# Patient Record
Sex: FEMALE | Race: WHITE | Employment: FULL TIME | URBAN - METROPOLITAN AREA
[De-identification: names, ages, dates, MRNs, and addresses within clinical notes are randomized per-mention and may not be internally consistent; named-entity substitution may affect disease eponyms.]

---

## 2018-07-27 ENCOUNTER — ROUTINE PRENATAL (OUTPATIENT)
Dept: OBGYN CLINIC | Age: 33
End: 2018-07-27

## 2018-07-27 ENCOUNTER — HOSPITAL ENCOUNTER (OUTPATIENT)
Dept: LAB | Age: 33
Discharge: HOME | End: 2018-07-27
Payer: COMMERCIAL

## 2018-07-27 ENCOUNTER — OFFICE VISIT (OUTPATIENT)
Dept: OBGYN CLINIC | Age: 33
End: 2018-07-27

## 2018-07-27 VITALS
TEMPERATURE: 98 F | SYSTOLIC BLOOD PRESSURE: 117 MMHG | HEIGHT: 63 IN | DIASTOLIC BLOOD PRESSURE: 87 MMHG | HEART RATE: 78 BPM | OXYGEN SATURATION: 100 %

## 2018-07-27 VITALS
SYSTOLIC BLOOD PRESSURE: 117 MMHG | HEIGHT: 63 IN | TEMPERATURE: 98 F | OXYGEN SATURATION: 98 % | DIASTOLIC BLOOD PRESSURE: 81 MMHG | HEART RATE: 78 BPM

## 2018-07-27 DIAGNOSIS — N89.8 VAGINAL DISCHARGE: ICD-10-CM

## 2018-07-27 DIAGNOSIS — N92.6 MISSED MENSES: Primary | ICD-10-CM

## 2018-07-27 DIAGNOSIS — O20.0 THREATENED ABORTION: ICD-10-CM

## 2018-07-27 DIAGNOSIS — O09.291 HISTORY OF MISCARRIAGE, CURRENTLY PREGNANT, FIRST TRIMESTER: ICD-10-CM

## 2018-07-27 LAB
HCG URINE, QL. (POC): POSITIVE
VALID INTERNAL CONTROL?: YES

## 2018-07-27 PROCEDURE — 87491 CHLMYD TRACH DNA AMP PROBE: CPT | Performed by: OBSTETRICS & GYNECOLOGY

## 2018-07-27 NOTE — PROGRESS NOTES
Name: Iwona Gutierrez MRN: [id number] G2    YOB: 1985  Age: 27 y.o. Sex: female        Chief Complaint   Patient presents with    Missed Menses       HPI     1. Missed menses  Pt noted that her LMP was unsure, is 8 weeks she thinks, she had a miscarriage May 22, confirmed at urgent care with blood test, thought she was 6-7 weeks, had vaginal bleeding May 22 and assumed she was having a miscarriage, saw [facility] who did a pregnancy test which was neg, never had an US, took a pregnancy test at the end of  which was +, is having nausea, is not having vomiting, is not having VB currently but is having brown D/C that seems more yellow, is having cramping off/on, is taking a prenatal vitamin, is having breast tenderness, was using nothing for birth control     2. Vaginal bleeding  Notes that she has had spotting and brown D/C that has recently turned yellow    3. Miscarriage  LMP , + pregnancy test, had bleeding , went to [facility], had neg UPT, end of  had + UPT again    OB History      Para Term  AB Living    2 1  1 1 1    SAB TAB Ectopic Molar Multiple Live Births    1    0 1        Obstetric Comments    PROM at 35 weeks    LMP no period since miscarriage in May  Periods irregular, last 6 days, flow medium, minimal dysmenorrhea  History of sexually transmitted infections never  Last pap unsure             PGyn  History   Sexual Activity    Sexual activity: Yes    Partners: Male         Past Medical History:   Diagnosis Date    Abnormal Papanicolaou smear of cervix     conization of cervix for abn pap    Infertility, female        Past Surgical History:   Procedure Laterality Date    CKC, AKA COLD KNIFE CONE         No Known Allergies    Current Outpatient Prescriptions on File Prior to Visit   Medication Sig Dispense Refill    prenatal multivit-ca-min-fe-fa tab Take  by mouth. No current facility-administered medications on file prior to visit. Social History     Social History    Marital status:      Spouse name: N/A    Number of children: N/A    Years of education: N/A     Occupational History    Not on file. Social History Main Topics    Smoking status: Former Smoker    Smokeless tobacco: Never Used    Alcohol use Yes      Comment: wine monthly, not while pregnant    Drug use: No    Sexual activity: Yes     Partners: Male     Other Topics Concern    Not on file     Social History Narrative       Family History   Problem Relation Age of Onset    No Known Problems Mother     Heart Disease Father        Review of Systems   Constitutional: Negative. HENT: Negative. Respiratory: Negative. Cardiovascular: Negative. Gastrointestinal: Positive for abdominal pain and nausea. Negative for constipation, diarrhea and vomiting. Genitourinary: Negative. Musculoskeletal: Negative. Skin: Negative. Neurological: Negative. Psychiatric/Behavioral: Negative. Visit Vitals    /81    Pulse 78    Temp 98 °F (36.7 °C) (Oral)    Ht 5' 3\" (1.6 m)    SpO2 98%           GENERAL:  Well developed, well nourished, in no distress  NEURO/PSYCHE: Grossly intact, normal mood and affect  HEENT: Normal cephalic, atraumatic, good dentition, neck supple.  No thyromegaly  CV: regular rate and rhythm  LUNGS: clear to auscultation bilaterally, no wheezes, rhonchi or rales, good air entry with normal effort  ABDOMEN: + BS, soft without tenderness, no guarding, rebound or masses  EXTREMITIES: no edema or erythema noted  SKIN:  Warm, dry, no lesions  LYMPHATICS: No supraclavicular or inguinal nodes noted    PELVIC EXAM:  LABIA MAJORA: no masses, tenderness or lesions  LABIA MINORA: no masses, tenderness or lesions  CLITORIS: no masses, tenderness or lesions  URETHRA: normal appearing, no masses or tenderness  BLADDER: no fullness or tenderness  VAGINA: pink appearing vagina with small amount of blood-streaked mucus and physiologic appearing discharge, 2 mm cystic lesion removed from her cervix  PERINEUM: no masses, tenderness or lesions  CERVIX: No CMT or lesions  UTERUS: small, mobile, nontender  ADNEXA: nontender and no masses    Recent Results (from the past 24 hour(s))   AMB POC URINE PREGNANCY TEST, VISUAL COLOR COMPARISON    Collection Time: 18  3:30 PM   Result Value Ref Range    VALID INTERNAL CONTROL POC Yes     HCG urine, Ql. (POC) Positive Negative         ICD-10-CM ICD-9-CM   1. Missed menses N92.6 626.4   2. Threatened  O20.0 640.00   3. History of miscarriage, currently pregnant, first trimester O09.291 V23.2   4. Vaginal discharge N89.8 623.5       1. Missed menses  Never had a menses after miscarriage, reviewed pregnancy nutrition, staying on her prenatal vitamins. 2. Threatened   Reviewed that she had vaginal bleeding that has turned to brown discharge, ultrasound showed viable IUP. Reviewed that she will be AMA with this pregnancy and offered first trimester testing with [facility] which patient would like referral made to [facility]. - AMB POC US OB TRANSVAGINAL    3. History of miscarriage, currently pregnant, first trimester  No no menses since her miscarriage    4. Vaginal discharge  No itching or odor, will test for STIs  - CHLAMYDIA/NEISSERIA/TRICHOMONAS AMP;  Future    F/U 4 weeks for NOB

## 2018-07-27 NOTE — PATIENT INSTRUCTIONS
Screening Tests for Birth Defects: Care Instructions  Your Care Instructions    Screening tests for birth defects are done during pregnancy to look for possible problems with the baby (fetus). They show the chance that a baby has a certain birth defect. Down syndrome, spina bifida, and trisomy 25 are examples. There are many types of screening tests you may have during your pregnancy. During your first trimester you may have:  · Blood tests at 10 to 13 weeks. · Nuchal translucency test at 11 to 14 weeks. · Cell free fetal DNA test at 10 weeks or later. During your second trimester, you may have:  · Triple or quad screening at 15 to 20 weeks. · Ultrasound at 18 to 20 weeks. Follow-up care is a key part of your treatment and safety. Be sure to make and go to all appointments, and call your doctor if you are having problems. It's also a good idea to know your test results and keep a list of the medicines you take. Why are these tests done? Blood tests measure the amounts of certain substances in your blood. For these tests, a health professional takes a sample of your blood. Cell free fetal DNA test is used to help find genetic problems. Triple or quad screening are blood tests that can be used to find out if there is a risk of certain health problems. If any of these tests point to a problem, your doctor will suggest other tests to find out for sure if there is a problem. Nuchal translucency test uses ultrasound to measure the thickness of the area at the back of the baby's neck. An increase in thickness can be an early sign of certain birth defects. Ultrasound is a tool that uses sound waves to make pictures of your baby and placenta inside the uterus. Ultrasound lets your doctor see an image of your baby. It can help your doctor look for problems of the heart, spine, belly, or other areas. Many pregnant women choose to have these tests done as a routine part of their care.  Some choose to have tests if they are at higher risk for having a baby with a birth defect. How can you care for yourself at home? · You can return right away to your usual activities for this stage of pregnancy, unless your doctor gives you different instructions. · If you are concerned or worried about the results of your tests, talk with loved ones or friends. You can also talk to a genetic counselor or your doctor. When should you call for help? Watch closely for changes in your health, and be sure to contact your doctor if you have any problems. Where can you learn more? Go to http://ayakaiSIGHT Partnersmarie.info/. Enter 830 0115 in the search box to learn more about \"Screening Tests for Birth Defects: Care Instructions. \"  Current as of: November 21, 2017  Content Version: 11.7  © 7610-3750 [employer]. Care instructions adapted under license by [employer] (which disclaims liability or warranty for this information). If you have questions about a medical condition or this instruction, always ask your healthcare professional. [place] [address] any warranty or liability for your use of this information. Weeks 6 to 10 of Your Pregnancy: Care Instructions  Your Care Instructions    Congratulations on your pregnancy. This is an exciting and important time for you. During the first 6 to 10 weeks of your pregnancy, your body goes through many changes. Your baby grows very fast, even though you cannot feel it yet. You may start to notice that you feel different, both in your body and your emotions. Because each woman's pregnancy is unique, there is no right way to feel. You may feel the healthiest you have ever been, or you may feel tired or sick to your stomach (\"morning sickness\"). These early weeks are a time to make healthy choices and to eat the best foods for you and your baby. This care sheet will give you some ideas. This is also a good time to think about birth defects testing. These are tests done during pregnancy to look for possible problems with the baby. First trimester tests for birth defects can be done between 8 and 17 weeks of pregnancy, depending on the test. Talk with your doctor about what kinds of tests are available. Follow-up care is a key part of your treatment and safety. Be sure to make and go to all appointments, and call your doctor if you are having problems. It's also a good idea to know your test results and keep a list of the medicines you take. How can you care for yourself at home? Eat well  · Eat at least 3 meals and 2 healthy snacks every day. Eat fresh, whole foods, including:  ¨ 7 or more servings of bread, tortillas, cereal, rice, pasta, or oatmeal.  ¨ 3 or more servings of vegetables, especially leafy green vegetables. ¨ 2 or more servings of fruits. ¨ 3 or more servings of milk, yogurt, or cheese. ¨ 2 or more servings of meat, turkey, chicken, fish, eggs, or dried beans. · Drink plenty of fluids, especially water. Avoid sodas and other sweetened drinks. · Choose foods that have important vitamins for your baby, such as calcium, iron, and folate. ¨ Dairy products, tofu, canned fish with bones, almonds, broccoli, dark leafy greens, corn tortillas, and fortified orange juice are good sources of calcium. ¨ Beef, poultry, liver, spinach, lentils, dried beans, fortified cereals, and dried fruits are rich in iron. ¨ Dark leafy greens, broccoli, asparagus, liver, fortified cereals, orange juice, peanuts, and almonds are good sources of folate. · Avoid foods that could harm your baby. ¨ Do not eat raw or undercooked meat, chicken, or fish (such as sushi or raw oysters). ¨ Do not eat raw eggs or foods that contain raw eggs, such as Caesar dressing. ¨ Do not eat soft cheeses and unpasteurized dairy foods, such as Brie, feta, or blue cheese. ¨ Do not eat fish that contains a lot of mercury, such as shark, swordfish, tilefish, or trent mackerel.  Do not eat more than 6 ounces of tuna each week. ¨ Do not eat raw sprouts, especially alfalfa sprouts. ¨ Cut down on caffeine, such as coffee, tea, and cola. Protect yourself and your baby  · Do not touch leatha litter or cat feces. They can cause an infection that could harm your baby. · High body temperature can be harmful to your baby. So if you want to use a sauna or hot tub, be sure to talk to your doctor about how to use it safely. [place] with morning sickness  · Sip small amounts of water, juices, or shakes. Try drinking between meals, not with meals. · Eat 5 or 6 small meals a day. Try dry toast or crackers when you first get up, and eat breakfast a little later. · Avoid spicy, greasy, and fatty foods. · When you feel sick, open your windows or go for a short walk to get fresh air. · Try nausea wristbands. These help some women. · Tell your doctor if you think your prenatal vitamins make you sick. Where can you learn more? Go to http://ayaka-marie.info/. Enter G112 in the search box to learn more about \"Weeks 6 to 10 of Your Pregnancy: Care Instructions. \"  Current as of: November 21, 2017  Content Version: 11.7  © 5024-1271 [employer]. Care instructions adapted under license by [employer] (which disclaims liability or warranty for this information). If you have questions about a medical condition or this instruction, always ask your healthcare professional. [place] [address] any warranty or liability for your use of this information.

## 2018-07-27 NOTE — LETTER
7/27/2018 4:33 PM 
 
Ms. Cali [address] [address] 
 
 
 is an obstetric patient in my practice. She is cleared to have dental work with a double shield x-ray, lidocaine, and amoxicillin for treatment. Should you require additional information please contact our office at [phone]. Sincerely, Aunpama Moreno MD

## 2018-07-30 ENCOUNTER — DOCUMENTATION ONLY (OUTPATIENT)
Dept: OBGYN CLINIC | Age: 33
End: 2018-07-30

## 2018-07-30 LAB
C TRACH RRNA SPEC QL NAA+PROBE: NEGATIVE
N GONORRHOEA RRNA SPEC QL NAA+PROBE: NEGATIVE
SPECIMEN SOURCE: NORMAL
T VAGINALIS RRNA SPEC QL NAA+PROBE: NEGATIVE

## 2018-08-22 ENCOUNTER — HOSPITAL ENCOUNTER (OUTPATIENT)
Dept: LAB | Age: 33
Discharge: HOME | End: 2018-08-22
Payer: COMMERCIAL

## 2018-08-22 ENCOUNTER — ROUTINE PRENATAL (OUTPATIENT)
Dept: OBGYN CLINIC | Age: 33
End: 2018-08-22

## 2018-08-22 VITALS
OXYGEN SATURATION: 100 % | DIASTOLIC BLOOD PRESSURE: 77 MMHG | HEART RATE: 82 BPM | WEIGHT: 139.4 LBS | SYSTOLIC BLOOD PRESSURE: 123 MMHG | BODY MASS INDEX: 24.7 KG/M2 | HEIGHT: 63 IN | TEMPERATURE: 99 F

## 2018-08-22 DIAGNOSIS — Z98.890 HISTORY OF LEEP (LOOP ELECTROSURGICAL EXCISION PROCEDURE) OF CERVIX COMPLICATING PREGNANCY, FIRST TRIMESTER: ICD-10-CM

## 2018-08-22 DIAGNOSIS — O34.41 HISTORY OF LEEP (LOOP ELECTROSURGICAL EXCISION PROCEDURE) OF CERVIX COMPLICATING PREGNANCY, FIRST TRIMESTER: ICD-10-CM

## 2018-08-22 DIAGNOSIS — O09.521 ENCOUNTER FOR SUPERVISION OF MULTIGRAVIDA OF ADVANCED MATERNAL AGE IN FIRST TRIMESTER: ICD-10-CM

## 2018-08-22 DIAGNOSIS — Z3A.11 11 WEEKS GESTATION OF PREGNANCY: ICD-10-CM

## 2018-08-22 DIAGNOSIS — O09.521 ENCOUNTER FOR SUPERVISION OF MULTIGRAVIDA OF ADVANCED MATERNAL AGE IN FIRST TRIMESTER: Primary | ICD-10-CM

## 2018-08-22 DIAGNOSIS — O09.291 HISTORY OF PROM (PREMATURE RUPTURE OF MEMBRANES), CURRENTLY PREGNANT, FIRST TRIMESTER: ICD-10-CM

## 2018-08-22 LAB
ABO + RH BLD: NORMAL
BLOOD GROUP ANTIBODIES SERPL: NORMAL
ERYTHROCYTE [DISTWIDTH] IN BLOOD BY AUTOMATED COUNT: 12.9 % (ref 11.6–14.5)
HCT VFR BLD AUTO: 41.2 % (ref 35–45)
HGB BLD-MCNC: 14.5 G/DL (ref 12–16)
MCH RBC QN AUTO: 31.2 PG (ref 24–34)
MCHC RBC AUTO-ENTMCNC: 35.2 G/DL (ref 31–37)
MCV RBC AUTO: 88.6 FL (ref 74–97)
PLATELET # BLD AUTO: 241 K/UL (ref 135–420)
PMV BLD AUTO: 10.5 FL (ref 9.2–11.8)
RBC # BLD AUTO: 4.65 M/UL (ref 4.2–5.3)
SPECIMEN EXP DATE BLD: NORMAL
WBC # BLD AUTO: 9.5 K/UL (ref 4.6–13.2)

## 2018-08-22 PROCEDURE — 87340 HEPATITIS B SURFACE AG IA: CPT | Performed by: OBSTETRICS & GYNECOLOGY

## 2018-08-22 PROCEDURE — 86762 RUBELLA ANTIBODY: CPT | Performed by: OBSTETRICS & GYNECOLOGY

## 2018-08-22 PROCEDURE — 87389 HIV-1 AG W/HIV-1&-2 AB AG IA: CPT | Performed by: OBSTETRICS & GYNECOLOGY

## 2018-08-22 PROCEDURE — 81220 CFTR GENE COM VARIANTS: CPT | Performed by: OBSTETRICS & GYNECOLOGY

## 2018-08-22 PROCEDURE — 88175 CYTOPATH C/V AUTO FLUID REDO: CPT | Performed by: OBSTETRICS & GYNECOLOGY

## 2018-08-22 PROCEDURE — 87624 HPV HI-RISK TYP POOLED RSLT: CPT | Performed by: OBSTETRICS & GYNECOLOGY

## 2018-08-22 PROCEDURE — 83021 HEMOGLOBIN CHROMOTOGRAPHY: CPT | Performed by: OBSTETRICS & GYNECOLOGY

## 2018-08-22 PROCEDURE — 86900 BLOOD TYPING SEROLOGIC ABO: CPT | Performed by: OBSTETRICS & GYNECOLOGY

## 2018-08-22 PROCEDURE — 85027 COMPLETE CBC AUTOMATED: CPT | Performed by: OBSTETRICS & GYNECOLOGY

## 2018-08-22 PROCEDURE — 86780 TREPONEMA PALLIDUM: CPT | Performed by: OBSTETRICS & GYNECOLOGY

## 2018-08-22 NOTE — MR AVS SNAPSHOT
[address] [place], [place] [address]Jareth Perez 030, [address] [place] [facility] [address] 
[phone] Patient: Olivia Sanchez MRN: [id number] :[id number] Visit Information Date & Time Provider Department Dept. Phone Encounter #  
 2018  4:00 PM Mohini Thapa MD [facility] [id number] Follow-up Instructions Return in about 4 weeks (around 2018) for return OB. Upcoming Health Maintenance Date Due  
 PAP AKA CERVICAL CYTOLOGY 2004 Influenza Age 5 to Adult 2018 Allergies as of 2018  Review Complete On: 2018 By: Mohini Thapa MD  
 No Known Allergies Current Immunizations  Never Reviewed No immunizations on file. Not reviewed this visit You Were Diagnosed With   
  
 Codes Comments Encounter for supervision of multigravida of advanced maternal age in first trimester    -  Primary ICD-10-CM: O09.521 ICD-9-CM: V23.82   
 11 weeks gestation of pregnancy     ICD-10-CM: Z3A.11 
ICD-9-CM: V22.2 History of PROM (premature rupture of membranes), currently pregnant, first trimester     ICD-10-CM: O09.291 ICD-9-CM: V23.49 History of LEEP (loop electrosurgical excision procedure) of cervix complicating pregnancy, first trimester     ICD-10-CM: O34.41, E35.958 ICD-9-CM: 654.63, V45.89 Vitals BP Pulse Temp Height(growth percentile) Weight(growth percentile) LMP  
 123/77 82 99 °F (37.2 °C) (Oral) 5' 3\" (1.6 m) 139 lb 6.4 oz (63.2 kg) 2018 SpO2 BMI OB Status Smoking Status 100% 24.69 kg/m2 Pregnant Former Smoker BMI and BSA Data Body Mass Index Body Surface Area  
 24.69 kg/m 2 1.68 m 2 Preferred Pharmacy Pharmacy Name Phone [facility], [address] [place] [address] [phone] Your Updated Medication List  
  
   
This list is accurate as of 18  5:12 PM.  Always use your most recent med list.  
  
  
  
 prenatal multivit-ca-min-fe-fa Tab Take  by mouth. Follow-up Instructions Return in about 4 weeks (around 9/19/2018) for return OB. To-Do List   
 08/22/2018 Lab:  CBC W/O DIFF   
  
 08/22/2018 Lab:  CYSTIC FIBROSIS MUTATIONS   
  
 08/22/2018 Lab:  HEMOGLOBIN FRACTIONATION   
  
 08/22/2018 Lab:  HEP B SURFACE AG   
  
 08/22/2018 Lab:  HIV 1/2 AG/AB, 4TH GENERATION,W RFLX CONFIRM   
  
 08/22/2018 Pathology:  PAP IG, APTIMA HPV AND RFX 16/18,45 ([id number])   
  
 08/22/2018 Lab:  RUBELLA AB, IGG   
  
 08/22/2018 Blood Bank:  TYPE & SCREEN   
  
 08/24/2018 Lab:  T PALLIDUM AB Patient Instructions Learning About Birth Defects Testing What is birth defects testing? Birth defects testing is done during pregnancy to look for possible problems with the baby (fetus). A birth defect may have only a minor impact on a child's life. Or it can have a major effect on quality or length of life. You and your doctor can choose from many tests. You may have no tests, one test, or many tests. Talking with a genetic counselor may help you make decisions about testing. The counselor is trained to help you understand these tests. He or she can also help you find resources for support. What are the types of tests? You may have a screening test or a diagnostic test. Or you may have both types of tests. Screening tests show the chance that a baby has a certain birth defect, such as Down syndrome, spina bifida, or trisomy 25. Diagnostic tests show if a baby has a certain birth defect. · Screening tests and when they are done: ¨ Blood tests at 10 to 13 weeks (first trimester) ¨ Cell free fetal DNA test at 10 weeks or later (first trimester) ¨ Nuchal translucency test at 11 to 14 weeks (first trimester) ¨ Triple or quad screening at 15 to 20 weeks (second trimester) ¨ Ultrasound at 18 to 20 weeks (second trimester) · Diagnostic tests and when they are done: ¨ Chorionic villus sampling (CVS) at 10 to 13 weeks (first trimester) ¨ Amniocentesis at 13 to 20 weeks (second trimester) In some cases, the doctors look at the combined screenings that you've had over a period of time. This is called an integrated screening. What are the screening tests? · Blood tests are done to look at different substances in your blood. These tests include cell free fetal DNA and triple or quadruple (quad) blood tests. Both of these tests can help find genetic problems. · Nuchal translucency test uses ultrasound to measure the thickness of the area at the back of the baby's neck. An increase in thickness can be an early sign of certain birth defects. Ultrasound is a tool that uses sound waves to make pictures of your baby and placenta inside the uterus. · Ultrasound lets your doctor see an image of your baby. It can help your doctor look for problems of the heart, spine, belly, or other areas. What are the diagnostic tests? Chorionic villus sampling (CVS) looks at cells from the placenta. To do the test, your doctor may put a thin tube through your vagina and cervix to take out a small piece of the placenta. Or the doctor may take out the piece through a needle in your belly. This test can diagnose many genetic diseases. But it can't find problems with the spinal cord. Amniocentesis looks at the amniotic fluid that surrounds your baby. Your doctor will put a needle through your belly into your uterus and take out a very small amount of fluid to test. 
What are the risks of these tests? There is a small risk of a miscarriage after a CVS or amniocentesis. Your doctor or genetic counselor can help you understand this risk. These tests are generally very safe. Where can you learn more? Go to http://ayaka-marie.info/. Enter G030 in the search box to learn more about \"Learning About Birth Defects Testing. \" 
 Current as of: November 21, 2017 Content Version: 11.7 © 7670-9984 [email or web address]. Care instructions adapted under license by [employer] (which disclaims liability or warranty for this information). If you have questions about a medical condition or this instruction, always ask your healthcare professional. [address] any warranty or liability for your use of this information. Weeks 10 to 14 of Your Pregnancy: Care Instructions Your Care Instructions By weeks 10 to 15 of your pregnancy, the placenta has formed inside your uterus. It is possible to hear your baby's heartbeat with a special ultrasound device. Your baby's eyes can and do move. The arms and legs can bend. This is a good time to think about testing for birth defects. There are two types of tests: screening and diagnostic. Screening tests show the chance that a baby has a certain birth defect. They can't tell you for sure that your baby has a problem. Diagnostic tests show if a baby has a certain birth defect. It's your choice whether to have these tests. You and your partner can talk to your doctor or midwife about birth defects tests. Follow-up care is a key part of your treatment and safety. Be sure to make and go to all appointments, and call your doctor if you are having problems. It's also a good idea to know your test results and keep a list of the medicines you take. How can you care for yourself at home? Decide about tests · You can have screening tests and diagnostic tests to check for birth defects. The decision to have a test for birth defects is personal. Think about your age, your chance of passing on a family disease, your need to know about any problems, and what you might do after you have the test results. ¨ Triple or quadruple (quad) blood tests. These screening tests can be done between 15 and 20 weeks of pregnancy.  They check the amounts of three or four substances in your blood. The doctor looks at these test results, along with your age and other factors, to find out the chance that your baby may have certain problems. ¨ Amniocentesis. This diagnostic test is used to look for chromosomal problems in the baby's cells. It can be done between 15 and 20 weeks of pregnancy, usually around week 16. 
¨ Nuchal translucency test. This test uses ultrasound to measure the thickness of the area at the back of the baby's neck. An increase in the thickness can be an early sign of Down syndrome. ¨ Chorionic villus sampling (CVS). This is a test that looks for certain genetic problems with your baby. The same genes that are in your baby are in the placenta. A small piece of the placenta is taken out and tested. This test is done when you are 10 to 13 weeks pregnant. Ease discomfort · Slow down and take naps when you feel tired. · If your emotions swing, talk to someone. Crying, anxiety, and concentration problems are common. · If your gums bleed, try a softer toothbrush. If your gums are puffy and bleed a lot, see your dentist. 
· If you feel dizzy: ¨ Get up slowly after sitting or lying down. ¨ Drink plenty of fluids. ¨ Eat small snacks to keep your blood sugar stable. ¨ Put your head between your legs as though you were tying your shoelaces. ¨ Lie down with your legs higher than your head. Use pillows to prop up your feet. · If you have a headache: 
¨ Lie down. ¨ Ask your partner or a good friend for a neck massage. ¨ Try cool cloths over your forehead or across the back of your neck. ¨ Use acetaminophen (Tylenol) for pain relief. Do not use nonsteroidal anti-inflammatory drugs (NSAIDs), such as ibuprofen (Advil, Motrin) or naproxen (Aleve), unless your doctor says it is okay. · If you have a nosebleed, pinch your nose gently, and hold it for a short while.  To prevent nosebleeds, try massaging a small dab of petroleum jelly, such as Vaseline, in your nostrils. · If your nose is stuffed up, try saline (saltwater) nose sprays. Do not use decongestant sprays. Care for your breasts · Wear a bra that gives you good support. · Know that changes in your breasts are normal. 
¨ Your breasts may get larger and more tender. Tenderness usually gets better by 12 weeks. ¨ Your nipples may get darker and larger, and small bumps around your nipples may show more. ¨ The veins in your chest and breasts may show more. · Don't worry about \"toughening'\" your nipples. Breastfeeding will naturally do this. Where can you learn more? Go to http://ayaka-marie.info/. Enter K902 in the search box to learn more about \"Weeks 10 to 14 of Your Pregnancy: Care Instructions. \" Current as of: November 21, 2017 Content Version: 11.7 © 1926-8221 Signpost. Care instructions adapted under license by [employer] (which disclaims liability or warranty for this information). If you have questions about a medical condition or this instruction, always ask your healthcare professional. [place] [address] any warranty or liability for your use of this information. Introducing [facility]! [employer] introduces Vector[employer] patient portal. Now you can access parts of your medical record, email your doctor's office, and request medication refills online. 1. In your internet browser, go to [email or web address] 2. Click on the First Time User? Click Here link in the Sign In box. You will see the New Member Sign Up page. 3. Enter your VectorMAX Access Code exactly as it appears below. You will not need to use this code after youve completed the sign-up process. If you do not sign up before the expiration date, you must request a new code. · VectorMAX Access Code: [email or web address] Expires: 11/12/2018  5:20 AM 
 
 4. Enter the last four digits of your Social Security Number (xxxx) and Date of Birth (mm/dd/yyyy) as indicated and click Submit. You will be taken to the next sign-up page. 5. Create a [employer] ID. This will be your [employer] login ID and cannot be changed, so think of one that is secure and easy to remember. 6. Create a [employer] password. You can change your password at any time. 7. Enter your Password Reset Question and Answer. This can be used at a later time if you forget your password. 8. Enter your e-mail address. You will receive e-mail notification when new information is available in [address] 9. Click Sign Up. You can now view and download portions of your medical record. 10. Click the Download Summary menu link to download a portable copy of your medical information. If you have questions, please visit the Frequently Asked Questions section of the [employer] website. Remember, [employer] is NOT to be used for urgent needs. For medical emergencies, dial 911. Now available from your iPhone and Android! Please provide this summary of care documentation to your next provider. Your primary care clinician is listed as Tana Flax. If you have any questions after today's visit, please call [phone].

## 2018-08-22 NOTE — PATIENT INSTRUCTIONS
Learning About Birth Defects Testing  What is birth defects testing? Birth defects testing is done during pregnancy to look for possible problems with the baby (fetus). A birth defect may have only a minor impact on a child's life. Or it can have a major effect on quality or length of life. You and your doctor can choose from many tests. You may have no tests, one test, or many tests. Talking with a genetic counselor may help you make decisions about testing. The counselor is trained to help you understand these tests. He or she can also help you find resources for support. What are the types of tests? You may have a screening test or a diagnostic test. Or you may have both types of tests. Screening tests show the chance that a baby has a certain birth defect, such as Down syndrome, spina bifida, or trisomy 25. Diagnostic tests show if a baby has a certain birth defect. · Screening tests and when they are done:  ¨ Blood tests at 10 to 13 weeks (first trimester)  ¨ Cell free fetal DNA test at 10 weeks or later (first trimester)  ¨ Nuchal translucency test at 11 to 14 weeks (first trimester)  ¨ Triple or quad screening at 15 to 20 weeks (second trimester)  ¨ Ultrasound at 18 to 20 weeks (second trimester)  · Diagnostic tests and when they are done:  ¨ Chorionic villus sampling (CVS) at 10 to 13 weeks (first trimester)  ¨ Amniocentesis at 13 to 20 weeks (second trimester)  In some cases, the doctors look at the combined screenings that you've had over a period of time. This is called an integrated screening. What are the screening tests? · Blood tests are done to look at different substances in your blood. These tests include cell free fetal DNA and triple or quadruple (quad) blood tests. Both of these tests can help find genetic problems. · Nuchal translucency test uses ultrasound to measure the thickness of the area at the back of the baby's neck.  An increase in thickness can be an early sign of certain birth defects. Ultrasound is a tool that uses sound waves to make pictures of your baby and placenta inside the uterus. · Ultrasound lets your doctor see an image of your baby. It can help your doctor look for problems of the heart, spine, belly, or other areas. What are the diagnostic tests? Chorionic villus sampling (CVS) looks at cells from the placenta. To do the test, your doctor may put a thin tube through your vagina and cervix to take out a small piece of the placenta. Or the doctor may take out the piece through a needle in your belly. This test can diagnose many genetic diseases. But it can't find problems with the spinal cord. Amniocentesis looks at the amniotic fluid that surrounds your baby. Your doctor will put a needle through your belly into your uterus and take out a very small amount of fluid to test.  What are the risks of these tests? There is a small risk of a miscarriage after a CVS or amniocentesis. Your doctor or genetic counselor can help you understand this risk. These tests are generally very safe. Where can you learn more? Go to http://ayaka-marie.info/. Enter G030 in the search box to learn more about \"Learning About Birth Defects Testing. \"  Current as of: November 21, 2017  Content Version: 11.7  © 1198-7186 [employer]Path. Care instructions adapted under license by [employer] (which disclaims liability or warranty for this information). If you have questions about a medical condition or this instruction, always ask your healthcare professional. [place] [address] any warranty or liability for your use of this information. Weeks 10 to 14 of Your Pregnancy: Care Instructions  Your Care Instructions    By weeks 10 to 14 of your pregnancy, the placenta has formed inside your uterus. It is possible to hear your baby's heartbeat with a special ultrasound device. Your baby's eyes can and do move.  The arms and legs can bend.  This is a good time to think about testing for birth defects. There are two types of tests: screening and diagnostic. Screening tests show the chance that a baby has a certain birth defect. They can't tell you for sure that your baby has a problem. Diagnostic tests show if a baby has a certain birth defect. It's your choice whether to have these tests. You and your partner can talk to your doctor or midwife about birth defects tests. Follow-up care is a key part of your treatment and safety. Be sure to make and go to all appointments, and call your doctor if you are having problems. It's also a good idea to know your test results and keep a list of the medicines you take. How can you care for yourself at home? Decide about tests  · You can have screening tests and diagnostic tests to check for birth defects. The decision to have a test for birth defects is personal. Think about your age, your chance of passing on a family disease, your need to know about any problems, and what you might do after you have the test results. ¨ Triple or quadruple (quad) blood tests. These screening tests can be done between 15 and 20 weeks of pregnancy. They check the amounts of three or four substances in your blood. The doctor looks at these test results, along with your age and other factors, to find out the chance that your baby may have certain problems. ¨ Amniocentesis. This diagnostic test is used to look for chromosomal problems in the baby's cells. It can be done between 15 and 20 weeks of pregnancy, usually around week 16.  ¨ Nuchal translucency test. This test uses ultrasound to measure the thickness of the area at the back of the baby's neck. An increase in the thickness can be an early sign of Down syndrome. ¨ Chorionic villus sampling (CVS). This is a test that looks for certain genetic problems with your baby. The same genes that are in your baby are in the placenta.  A small piece of the placenta is taken out and tested. This test is done when you are 10 to 13 weeks pregnant. Ease discomfort  · Slow down and take naps when you feel tired. · If your emotions swing, talk to someone. Crying, anxiety, and concentration problems are common. · If your gums bleed, try a softer toothbrush. If your gums are puffy and bleed a lot, see your dentist.  · If you feel dizzy:  ¨ Get up slowly after sitting or lying down. ¨ Drink plenty of fluids. ¨ Eat small snacks to keep your blood sugar stable. ¨ Put your head between your legs as though you were tying your shoelaces. ¨ Lie down with your legs higher than your head. Use pillows to prop up your feet. · If you have a headache:  ¨ Lie down. ¨ Ask your partner or a good friend for a neck massage. ¨ Try cool cloths over your forehead or across the back of your neck. ¨ Use acetaminophen (Tylenol) for pain relief. Do not use nonsteroidal anti-inflammatory drugs (NSAIDs), such as ibuprofen (Advil, Motrin) or naproxen (Aleve), unless your doctor says it is okay. · If you have a nosebleed, pinch your nose gently, and hold it for a short while. To prevent nosebleeds, try massaging a small dab of petroleum jelly, such as Vaseline, in your nostrils. · If your nose is stuffed up, try saline (saltwater) nose sprays. Do not use decongestant sprays. Care for your breasts  · Wear a bra that gives you good support. · Know that changes in your breasts are normal.  ¨ Your breasts may get larger and more tender. Tenderness usually gets better by 12 weeks. ¨ Your nipples may get darker and larger, and small bumps around your nipples may show more. ¨ The veins in your chest and breasts may show more. · Don't worry about \"toughening'\" your nipples. Breastfeeding will naturally do this. Where can you learn more? Go to http://ayaka-marie.info/. Enter W901 in the search box to learn more about \"Weeks 10 to 14 of Your Pregnancy: Care Instructions. \"  Current as of: November 21, 2017  Content Version: 11.7  © 0262-9913 [employer]. Care instructions adapted under license by [employer] (which disclaims liability or warranty for this information). If you have questions about a medical condition or this instruction, always ask your healthcare professional. [address] any warranty or liability for your use of this information.

## 2018-08-22 NOTE — PROGRESS NOTES
11w5d  Dated by 8 week US  PROM--> binu ordered  LEEP--> CL with [facility]MS  AMA--> reviewed risks  Voided before urine sample obtained  Urine cs at next visit  See flow sheet  1st trimester testing scheduled  Reviewed labs and imaging  RTC 4 weeks

## 2018-08-23 ENCOUNTER — DOCUMENTATION ONLY (OUTPATIENT)
Dept: OBGYN CLINIC | Age: 33
End: 2018-08-23

## 2018-08-23 LAB
DEPRECATED HGB OTHER BLD-IMP: 0 %
HBV SURFACE AG SER QL: <0.1 INDEX
HBV SURFACE AG SER QL: NEGATIVE
HGB A MFR BLD: 97.5 % (ref 96.4–98.8)
HGB A2 MFR BLD COLUMN CHROM: 2.5 % (ref 1.8–3.2)
HGB C MFR BLD: 0 %
HGB F MFR BLD: 0 % (ref 0–2)
HGB FRACT BLD-IMP: NORMAL
HGB S BLD QL SOLY: NEGATIVE
HGB S MFR BLD: 0 %
HIV 1+2 AB+HIV1 P24 AG SERPL QL IA: NONREACTIVE
HIV12 RESULT COMMENT, HHIVC: NORMAL
RUBV IGG SER-IMP: NORMAL
T PALLIDUM AB SER QL IA: NONREACTIVE

## 2018-08-23 NOTE — PROGRESS NOTES
Patient Sadie form was filled out for injection at her work and for delivery at her home per Dr. Marla Mcfarland.

## 2018-08-28 ENCOUNTER — DOCUMENTATION ONLY (OUTPATIENT)
Dept: OBGYN CLINIC | Age: 33
End: 2018-08-28

## 2018-08-31 LAB
CFTR MUT ANL BLD/T: NORMAL
COMMENT: 480556: NORMAL

## 2018-09-05 ENCOUNTER — DOCUMENTATION ONLY (OUTPATIENT)
Dept: OBGYN CLINIC | Age: 33
End: 2018-09-05

## 2018-09-05 NOTE — PROGRESS NOTES
US report for EVMS received from 8/29  First trimester evaluation, AMA  History of cone biopsy  Free cell DNA evaluation done    Placenta posterior, low-lying  AFP only at 16-18 weeks  Morphology scan 19-20 weeks  Serial cervical length scheduled  F/U at 16 weeks

## 2018-09-10 ENCOUNTER — DOCUMENTATION ONLY (OUTPATIENT)
Dept: OBGYN CLINIC | Age: 33
End: 2018-09-10

## 2018-09-10 NOTE — PROGRESS NOTES
Prior auth form completed and faxed and information sent to [facility] documenting the need for [place] injections through home health.

## 2018-09-16 ENCOUNTER — DOCUMENTATION ONLY (OUTPATIENT)
Dept: OBGYN CLINIC | Age: 33
End: 2018-09-16

## 2018-09-16 NOTE — PROGRESS NOTES
Report for [facility] received from 8/29 from Shannan  Low risk Trisomy 13,18, 21, monosomy X, triploidy  Female infant

## 2018-09-19 ENCOUNTER — DOCUMENTATION ONLY (OUTPATIENT)
Dept: OBGYN CLINIC | Age: 33
End: 2018-09-19

## 2018-09-21 ENCOUNTER — ROUTINE PRENATAL (OUTPATIENT)
Dept: OBGYN CLINIC | Age: 33
End: 2018-09-21

## 2018-09-21 ENCOUNTER — HOSPITAL ENCOUNTER (OUTPATIENT)
Dept: LAB | Age: 33
Discharge: HOME | End: 2018-09-21
Payer: COMMERCIAL

## 2018-09-21 VITALS
WEIGHT: 137.8 LBS | SYSTOLIC BLOOD PRESSURE: 108 MMHG | BODY MASS INDEX: 24.41 KG/M2 | TEMPERATURE: 98 F | DIASTOLIC BLOOD PRESSURE: 72 MMHG | HEART RATE: 117 BPM | OXYGEN SATURATION: 98 % | HEIGHT: 63 IN

## 2018-09-21 DIAGNOSIS — O09.521 ENCOUNTER FOR SUPERVISION OF MULTIGRAVIDA OF ADVANCED MATERNAL AGE IN FIRST TRIMESTER: ICD-10-CM

## 2018-09-21 DIAGNOSIS — Z23 ENCOUNTER FOR IMMUNIZATION: ICD-10-CM

## 2018-09-21 DIAGNOSIS — O09.291 HISTORY OF PROM (PREMATURE RUPTURE OF MEMBRANES), CURRENTLY PREGNANT, FIRST TRIMESTER: ICD-10-CM

## 2018-09-21 DIAGNOSIS — Z98.890 HISTORY OF LEEP (LOOP ELECTROSURGICAL EXCISION PROCEDURE) OF CERVIX COMPLICATING PREGNANCY, FIRST TRIMESTER: ICD-10-CM

## 2018-09-21 DIAGNOSIS — Z3A.16 16 WEEKS GESTATION OF PREGNANCY: ICD-10-CM

## 2018-09-21 DIAGNOSIS — O09.521 ENCOUNTER FOR SUPERVISION OF MULTIGRAVIDA OF ADVANCED MATERNAL AGE IN FIRST TRIMESTER: Primary | ICD-10-CM

## 2018-09-21 DIAGNOSIS — O34.41 HISTORY OF LEEP (LOOP ELECTROSURGICAL EXCISION PROCEDURE) OF CERVIX COMPLICATING PREGNANCY, FIRST TRIMESTER: ICD-10-CM

## 2018-09-21 PROCEDURE — 82105 ALPHA-FETOPROTEIN SERUM: CPT | Performed by: OBSTETRICS & GYNECOLOGY

## 2018-09-21 PROCEDURE — 87086 URINE CULTURE/COLONY COUNT: CPT | Performed by: OBSTETRICS & GYNECOLOGY

## 2018-09-21 NOTE — PATIENT INSTRUCTIONS
[email or web address]         Weeks 14 to 18 of Your Pregnancy: Care Instructions  Your Care Instructions    During this time, you may start to \"show,\" so that you look pregnant to people around you. You may also notice some changes in your skin, such as itchy spots on your palms or acne on your face. Your baby is now able to pass urine, and your baby's first stool (meconium) is starting to collect in his or her intestines. Hair is also beginning to grow on your baby's head. At your next visit, between weeks 18 and 20, your doctor may do an ultrasound test. The test allows your doctor to check for certain problems. Your doctor can also tell the sex of your baby. This is a good time to think about whether you want to know whether your baby is a boy or a girl. Talk to your doctor about getting a flu shot to help keep you healthy during your pregnancy. As your pregnancy moves along, it is common to worry or feel anxious. Your body is changing a lot. And you are thinking about giving birth, the health of your baby, and becoming a parent. You can learn to cope with any anxiety and stress you feel. Follow-up care is a key part of your treatment and safety. Be sure to make and go to all appointments, and call your doctor if you are having problems. It's also a good idea to know your test results and keep a list of the medicines you take. How can you care for yourself at home?   Reduce stress    · Ask for help with cooking and housekeeping.     · Figure out who or what causes your stress. Avoid these people or situations as much as possible.     · Relax every day. Taking 10- to 15-minute breaks can make a big difference. Take a walk, listen to music, or take a warm bath.     · Learn relaxation techniques at prenatal or yoga class. Or buy a relaxation tape.     · List your fears about having a baby and becoming a parent.  Share the list with someone you trust. Decide which worries are really small, and try to let them go. Exercise    · If you did not exercise much before pregnancy, start slowly. Walking is best. [employer], and do a little more every day.     · Brisk walking, easy jogging, low-impact aerobics, water aerobics, and yoga are good choices. Some sports, such as scuba diving, horseback riding, downhill skiing, gymnastics, and water skiing, are not a good idea.     · Try to do at least 2½ hours a week of moderate exercise, such as a fast walk. One way to do this is to be active 30 minutes a day, at least 5 days a week. It's fine to be active in blocks of 10 minutes or more throughout your day and week.     · Wear loose clothing. And wear shoes and a bra that provide good support.     · Warm up and cool down to start and finish your exercise.     · If you want to use weights, be sure to use light weights. They reduce stress on your joints.    Stay at the best weight for you    · Experts recommend that you gain about 1 pound a month during the first 3 months of your pregnancy.     · Experts recommend that you gain about 1 pound a week during your last 6 months of pregnancy, for a total weight gain of 25 to 35 pounds.     · If you are underweight, you will need to gain more weight (about 28 to 40 pounds).     · If you are overweight, you may not need to gain as much weight (about 15 to 25 pounds).     · If you are gaining weight too fast, use common sense. Exercise every day, and limit sweets, fast foods, and fats. Choose lean meats, fruits, and vegetables.     · If you are having twins or more, your doctor may refer you to a dietitian. Where can you learn more? Go to http://ayaka-marie.info/. Enter D467 in the search box to learn more about \"Weeks 14 to 18 of Your Pregnancy: Care Instructions. \"  Current as of: November 21, 2017  Content Version: 11.7  © 2369-2976 [employer].  Care instructions adapted under license by [employer] (which disclaims liability or warranty for this information). If you have questions about a medical condition or this instruction, always ask your healthcare professional. [address] any warranty or liability for your use of this information.

## 2018-09-21 NOTE — PROGRESS NOTES
HPI   16w0d  Dated by 8 weeks  PROM--> binu ordered  LEEP--> CL with [facility]  AMA--> Jay done  Voided before urine sample obtained  Urine cx at next visit  See flow sheet  Reviewed labs and imaging  AFP today  Anatomy scan scheduled with [facility]  Flu vaccine today  RTC 4 weeks          ROS

## 2018-09-21 NOTE — MR AVS SNAPSHOT
[facility] 
 
 
 [address], [address] [place] [address] 
[phone] Patient: Gordon Sage MRN: [id number] :1985 Visit Information Date & Time Provider Department Dept. Phone Encounter #  
 2018  8:00 AM Keaton Dai [id number] Follow-up Instructions Return in about 4 weeks (around 10/19/2018). Follow-up and Disposition History 10/23/2018  8:00 AM  
OB VISIT with MD Jd Dai ([facility]) Appt Note: 20 wk ob  
 [address], [address] [place] [address]  
[phone]  
  
   
 [address], [address] [place] [address] [place] Upcoming Health Maintenance Date Due Influenza Age 5 to Adult 2018 PAP AKA CERVICAL CYTOLOGY 2021 Allergies as of 2018  Review Complete On: 2018 By: Saran Quezada MD  
 No Known Allergies Current Immunizations  Never Reviewed No immunizations on file. Not reviewed this visit You Were Diagnosed With   
  
 Codes Comments Encounter for supervision of multigravida of advanced maternal age in first trimester    -  Primary ICD-10-CM: O09.521 ICD-9-CM: V23.82   
 16 weeks gestation of pregnancy     ICD-10-CM: Z3A.16 
ICD-9-CM: V22.2 History of PROM (premature rupture of membranes), currently pregnant, first trimester     ICD-10-CM: O09.291 ICD-9-CM: V23.49 History of LEEP (loop electrosurgical excision procedure) of cervix complicating pregnancy, first trimester     ICD-10-CM: O34.41, P37.022 ICD-9-CM: 654.63, V45.89 Vitals BP Pulse Temp Height(growth percentile) Weight(growth percentile) LMP  
 108/72 (!) 117 98 °F (36.7 °C) (Oral) 5' 3\" (1.6 m) 137 lb 12.8 oz (62.5 kg) 2018 SpO2 BMI OB Status Smoking Status 98% 24.41 kg/m2 Pregnant Former Smoker BMI and BSA Data Body Mass Index Body Surface Area 24.41 kg/m 2 1.67 m 2 Preferred Pharmacy Pharmacy Name Phone [facility] [place] [phone] Your Updated Medication List  
  
   
This list is accurate as of 9/21/18  9:15 AM.  Always use your most recent med list.  
  
  
  
  
 prenatal multivit-ca-min-fe-fa Tab Take  by mouth. Follow-up Instructions Return in about 4 weeks (around 10/19/2018). Patient Instructions   
[email or web address] Weeks 14 to 18 of Your Pregnancy: Care Instructions Your Care Instructions During this time, you may start to \"show,\" so that you look pregnant to people around you. You may also notice some changes in your skin, such as itchy spots on your palms or acne on your face. Your baby is now able to pass urine, and your baby's first stool (meconium) is starting to collect in his or her intestines. Hair is also beginning to grow on your baby's head. At your next visit, between weeks 18 and 20, your doctor may do an ultrasound test. The test allows your doctor to check for certain problems. Your doctor can also tell the sex of your baby. This is a good time to think about whether you want to know whether your baby is a boy or a girl. Talk to your doctor about getting a flu shot to help keep you healthy during your pregnancy. As your pregnancy moves along, it is common to worry or feel anxious. Your body is changing a lot. And you are thinking about giving birth, the health of your baby, and becoming a parent. You can learn to cope with any anxiety and stress you feel. Follow-up care is a key part of your treatment and safety. Be sure to make and go to all appointments, and call your doctor if you are having problems. It's also a good idea to know your test results and keep a list of the medicines you take. How can you care for yourself at home? 
 Reduce stress   · Ask for help with cooking and housekeeping.  
  · Figure out who or what causes your stress. Avoid these people or situations as much as possible.  
  · Relax every day. Taking 10- to 15-minute breaks can make a big difference. Take a walk, listen to music, or take a warm bath.  
  · Learn relaxation techniques at prenatal or yoga class. Or buy a relaxation tape.  
  · List your fears about having a baby and becoming a parent. Share the list with someone you trust. Decide which worries are really small, and try to let them go. Exercise 
  · If you did not exercise much before pregnancy, start slowly. Walking is best. [employer], and do a little more every day.  
  · Brisk walking, easy jogging, low-impact aerobics, water aerobics, and yoga are good choices. Some sports, such as scuba diving, horseback riding, downhill skiing, gymnastics, and water skiing, are not a good idea.  
  · Try to do at least 2½ hours a week of moderate exercise, such as a fast walk. One way to do this is to be active 30 minutes a day, at least 5 days a week. It's fine to be active in blocks of 10 minutes or more throughout your day and week.  
  · Wear loose clothing. And wear shoes and a bra that provide good support.  
  · Warm up and cool down to start and finish your exercise.  
  · If you want to use weights, be sure to use light weights. They reduce stress on your joints.  
 Stay at the best weight for you 
  · Experts recommend that you gain about 1 pound a month during the first 3 months of your pregnancy.  
  · Experts recommend that you gain about 1 pound a week during your last 6 months of pregnancy, for a total weight gain of 25 to 35 pounds.  
  · If you are underweight, you will need to gain more weight (about 28 to 40 pounds).  
  · If you are overweight, you may not need to gain as much weight (about 15 to 25 pounds).  
  · If you are gaining weight too fast, use common sense.  Exercise every day, and limit sweets, fast foods, and fats. Choose lean meats, fruits, and vegetables.  
  · If you are having twins or more, your doctor may refer you to a dietitian. Where can you learn more? Go to http://ayaka-marie.info/. Enter V198 in the search box to learn more about \"Weeks 14 to 18 of Your Pregnancy: Care Instructions. \" Current as of: November 21, 2017 Content Version: 11.7 © 9280-9992 [employer]. Care instructions adapted under license by [employer] (which disclaims liability or warranty for this information). If you have questions about a medical condition or this instruction, always ask your healthcare professional. [address] any warranty or liability for your use of this information. Patient Instructions History Introducing [facility]! [employer] introduces [employer] patient portal. Now you can access parts of your medical record, email your doctor's office, and request medication refills online. 1. In your internet browser, go to [email or web address] 2. Click on the First Time User? Click Here link in the Sign In box. You will see the New Member Sign Up page. 3. Enter your [employer] Access Code exactly as it appears below. You will not need to use this code after youve completed the sign-up process. If you do not sign up before the expiration date, you must request a new code. · [employer] Access Code: [email or web address] Expires: 11/12/2018  5:20 AM 
 
4. Enter the last four digits of your Social Security Number (xxxx) and Date of Birth (mm/dd/yyyy) as indicated and click Submit. You will be taken to the next sign-up page. 5. Create a [employer] ID. This will be your [employer] login ID and cannot be changed, so think of one that is secure and easy to remember. 6. Create a [employer] password. You can change your password at any time. 7. Enter your Password Reset Question and Answer. This can be used at a later time if you forget your password. 8. Enter your e-mail address. You will receive e-mail notification when new information is available in [address] 9. Click Sign Up. You can now view and download portions of your medical record. 10. Click the Download Summary menu link to download a portable copy of your medical information. If you have questions, please visit the Frequently Asked Questions section of the [employer] website. Remember, [employer] is NOT to be used for urgent needs. For medical emergencies, dial 911. Now available from your iPhone and Android! Please provide this summary of care documentation to your next provider. Your primary care clinician is listed as Genevive Kumar. If you have any questions after today's visit, please call [phone].

## 2018-09-23 LAB
BACTERIA SPEC CULT: NORMAL
SERVICE CMNT-IMP: NORMAL

## 2018-09-25 LAB
AFP ADJ MOM SERPL: 1.11
AFP INTERP SERPL-IMP: NORMAL
AFP SERPL-MCNC: 38.8 NG/ML
AGE AT DELIVERY: 35.2 YR
COMMENT, 018013: NORMAL
GA METHOD: NORMAL
GA: 16 WEEKS
IDDM PATIENT QL: NO
MAT SCN FOR FETAL ABNORMALITIES SERPL: NORMAL
MULTIPLE PREGNANCY: NO
NEURAL TUBE DEFECT RISK FETUS: 8647 %
RESULTS, 017004: NORMAL

## 2018-10-02 ENCOUNTER — TELEPHONE (OUTPATIENT)
Dept: OBGYN CLINIC | Age: 33
End: 2018-10-02

## 2018-10-03 ENCOUNTER — DOCUMENTATION ONLY (OUTPATIENT)
Dept: OBGYN CLINIC | Age: 33
End: 2018-10-03

## 2018-10-03 NOTE — PROGRESS NOTES
US report for [facility] received from 9/28  History of cone bx--> CL 3.1  History of ROM with PTD   gms, 46%, ,   Placenta posterior, no longer low lying  F/U 2 weeks

## 2018-10-16 ENCOUNTER — DOCUMENTATION ONLY (OUTPATIENT)
Dept: OBGYN CLINIC | Age: 33
End: 2018-10-16

## 2018-10-26 ENCOUNTER — ROUTINE PRENATAL (OUTPATIENT)
Dept: OBGYN CLINIC | Age: 33
End: 2018-10-26

## 2018-10-26 VITALS
RESPIRATION RATE: 18 BRPM | WEIGHT: 143 LBS | HEART RATE: 94 BPM | SYSTOLIC BLOOD PRESSURE: 110 MMHG | OXYGEN SATURATION: 97 % | BODY MASS INDEX: 25.34 KG/M2 | HEIGHT: 63 IN | DIASTOLIC BLOOD PRESSURE: 66 MMHG | TEMPERATURE: 98.5 F

## 2018-10-26 DIAGNOSIS — Z3A.21 21 WEEKS GESTATION OF PREGNANCY: Primary | ICD-10-CM

## 2018-11-01 ENCOUNTER — DOCUMENTATION ONLY (OUTPATIENT)
Dept: OBGYN CLINIC | Age: 33
End: 2018-11-01

## 2018-11-01 NOTE — PROGRESS NOTES
US report for [facility] received from 10/24  , 48%, , vx  Placenta posterior, XY, CL 3.2 cm  F/U for serial CL measurements

## 2018-11-09 ENCOUNTER — DOCUMENTATION ONLY (OUTPATIENT)
Dept: OBGYN CLINIC | Age: 33
End: 2018-11-09

## 2018-11-09 NOTE — PROGRESS NOTES
US report for [facility] received from 11/7  History of cone bx, h/o PTD at 35 weeks, AMA   gm, 28%, , CL 2.8 cm  Placenta posterior  F/U 2 weeks

## 2018-11-27 ENCOUNTER — ROUTINE PRENATAL (OUTPATIENT)
Dept: OBGYN CLINIC | Age: 33
End: 2018-11-27

## 2018-11-27 VITALS
WEIGHT: 149.4 LBS | TEMPERATURE: 97 F | OXYGEN SATURATION: 98 % | HEART RATE: 84 BPM | DIASTOLIC BLOOD PRESSURE: 65 MMHG | HEIGHT: 63 IN | BODY MASS INDEX: 26.47 KG/M2 | SYSTOLIC BLOOD PRESSURE: 108 MMHG | RESPIRATION RATE: 18 BRPM

## 2018-11-27 DIAGNOSIS — Z3A.25 25 WEEKS GESTATION OF PREGNANCY: Primary | ICD-10-CM

## 2018-11-27 NOTE — PROGRESS NOTES
25w4d  Dated by LMP c/w 8 week US  Pt completed surveillance of cervical length at [facility] due to h/o cone in   H/o 36 wk PROM and -offered but declined Sadie  See flow sheet  Reviewed labs and imaging  AFP negative  Anatomy scan at [facility] normal  Flu vaccine received   TDAP-offer at next visit  Breast feeding  RTC 3  Weeks  1 hr GTT next OV and TDAP

## 2018-11-27 NOTE — PATIENT INSTRUCTIONS

## 2018-12-03 ENCOUNTER — TELEPHONE (OUTPATIENT)
Dept: OBGYN CLINIC | Age: 33
End: 2018-12-03

## 2018-12-03 NOTE — TELEPHONE ENCOUNTER
2nd request.the patient is calling about [facility] paperwork. She said that she has a deadline for turning them in. Please give her a call.

## 2018-12-05 ENCOUNTER — DOCUMENTATION ONLY (OUTPATIENT)
Dept: OBGYN CLINIC | Age: 33
End: 2018-12-05

## 2018-12-18 ENCOUNTER — ROUTINE PRENATAL (OUTPATIENT)
Dept: OBGYN CLINIC | Age: 33
End: 2018-12-18

## 2018-12-18 ENCOUNTER — HOSPITAL ENCOUNTER (OUTPATIENT)
Dept: LAB | Age: 33
Discharge: HOME | End: 2018-12-18
Payer: COMMERCIAL

## 2018-12-18 VITALS
WEIGHT: 151.6 LBS | BODY MASS INDEX: 26.86 KG/M2 | DIASTOLIC BLOOD PRESSURE: 71 MMHG | HEIGHT: 63 IN | SYSTOLIC BLOOD PRESSURE: 109 MMHG | TEMPERATURE: 97.2 F | OXYGEN SATURATION: 98 % | HEART RATE: 96 BPM

## 2018-12-18 DIAGNOSIS — Z98.890 HISTORY OF LEEP (LOOP ELECTROSURGICAL EXCISION PROCEDURE) OF CERVIX COMPLICATING PREGNANCY, FIRST TRIMESTER: ICD-10-CM

## 2018-12-18 DIAGNOSIS — Z3A.28 28 WEEKS GESTATION OF PREGNANCY: ICD-10-CM

## 2018-12-18 DIAGNOSIS — O09.291 HISTORY OF PROM (PREMATURE RUPTURE OF MEMBRANES), CURRENTLY PREGNANT, FIRST TRIMESTER: ICD-10-CM

## 2018-12-18 DIAGNOSIS — O09.523 ENCOUNTER FOR SUPERVISION OF MULTIGRAVIDA OF ADVANCED MATERNAL AGE IN THIRD TRIMESTER: Primary | ICD-10-CM

## 2018-12-18 DIAGNOSIS — O34.41 HISTORY OF LEEP (LOOP ELECTROSURGICAL EXCISION PROCEDURE) OF CERVIX COMPLICATING PREGNANCY, FIRST TRIMESTER: ICD-10-CM

## 2018-12-18 DIAGNOSIS — O09.523 ENCOUNTER FOR SUPERVISION OF MULTIGRAVIDA OF ADVANCED MATERNAL AGE IN THIRD TRIMESTER: ICD-10-CM

## 2018-12-18 LAB
ERYTHROCYTE [DISTWIDTH] IN BLOOD BY AUTOMATED COUNT: 13.5 % (ref 11.6–14.5)
GLUCOSE 1H P 100 G GLC PO SERPL-MCNC: 106 MG/DL (ref 60–140)
HCT VFR BLD AUTO: 40.3 % (ref 35–45)
HGB BLD-MCNC: 13.2 G/DL (ref 12–16)
MCH RBC QN AUTO: 31.3 PG (ref 24–34)
MCHC RBC AUTO-ENTMCNC: 32.8 G/DL (ref 31–37)
MCV RBC AUTO: 95.5 FL (ref 74–97)
PLATELET # BLD AUTO: 222 K/UL (ref 135–420)
PMV BLD AUTO: 10.8 FL (ref 9.2–11.8)
RBC # BLD AUTO: 4.22 M/UL (ref 4.2–5.3)
WBC # BLD AUTO: 9.4 K/UL (ref 4.6–13.2)

## 2018-12-18 PROCEDURE — 85027 COMPLETE CBC AUTOMATED: CPT

## 2018-12-18 PROCEDURE — 82950 GLUCOSE TEST: CPT

## 2018-12-18 NOTE — PROGRESS NOTES
28w4d  Dated by 8 weeks  PROM--> binu declined  LEEP--> s/p CL with [facility], cone in 2000  AMA--> Jay done  See flow sheet  Tdap vaccine discussed and will get next time  Flu vaccine done  Reviewed labs and imaging  1 hour GTT, Rh, Hgb today  RTC 2 weeks

## 2018-12-18 NOTE — PATIENT INSTRUCTIONS

## 2018-12-19 NOTE — PROGRESS NOTES
Dear Ms. Kay Wu,    Just wanted to let you know that your diabetes screen and hemoglobin was normal. We will review it at your next appt.     Have a great day,    Dr. Falcon Course

## 2019-01-04 ENCOUNTER — ROUTINE PRENATAL (OUTPATIENT)
Dept: OBGYN CLINIC | Age: 34
End: 2019-01-04

## 2019-01-04 VITALS
OXYGEN SATURATION: 98 % | HEIGHT: 63 IN | HEART RATE: 102 BPM | BODY MASS INDEX: 27.85 KG/M2 | SYSTOLIC BLOOD PRESSURE: 121 MMHG | TEMPERATURE: 98 F | DIASTOLIC BLOOD PRESSURE: 69 MMHG | WEIGHT: 157.2 LBS

## 2019-01-04 DIAGNOSIS — Z23 NEED FOR TDAP VACCINATION: ICD-10-CM

## 2019-01-04 DIAGNOSIS — O09.291 HISTORY OF PROM (PREMATURE RUPTURE OF MEMBRANES), CURRENTLY PREGNANT, FIRST TRIMESTER: ICD-10-CM

## 2019-01-04 DIAGNOSIS — Z98.890 HISTORY OF LEEP (LOOP ELECTROSURGICAL EXCISION PROCEDURE) OF CERVIX COMPLICATING PREGNANCY, FIRST TRIMESTER: ICD-10-CM

## 2019-01-04 DIAGNOSIS — Z3A.31 31 WEEKS GESTATION OF PREGNANCY: ICD-10-CM

## 2019-01-04 DIAGNOSIS — O09.523 ENCOUNTER FOR SUPERVISION OF MULTIGRAVIDA OF ADVANCED MATERNAL AGE IN THIRD TRIMESTER: Primary | ICD-10-CM

## 2019-01-04 DIAGNOSIS — O34.41 HISTORY OF LEEP (LOOP ELECTROSURGICAL EXCISION PROCEDURE) OF CERVIX COMPLICATING PREGNANCY, FIRST TRIMESTER: ICD-10-CM

## 2019-01-04 NOTE — PATIENT INSTRUCTIONS
Weeks 26 to 30 of Your Pregnancy: Care Instructions  Your Care Instructions    You are now in your last trimester of pregnancy. Your baby is growing rapidly. And you'll probably feel your baby moving around more often. Your doctor may ask you to count your baby's kicks. Your back may ache as your body gets used to your baby's size and length. If you haven't already had the Tdap shot during this pregnancy, talk to your doctor about getting it. It will help protect your  against pertussis infection. During this time, it's important to take care of yourself and pay attention to what your body needs. If you feel sexual, explore ways to be close with your partner that match your comfort and desire. Use the tips provided in this care sheet to find ways to be sexual in your own way. Follow-up care is a key part of your treatment and safety. Be sure to make and go to all appointments, and call your doctor if you are having problems. It's also a good idea to know your test results and keep a list of the medicines you take. How can you care for yourself at home? Take it easy at work  · Take frequent breaks. If possible, stop working when you are tired, and rest during your lunch hour. · Take bathroom breaks every 2 hours. · Change positions often. If you sit for long periods, stand up and walk around. · When you stand for a long time, keep one foot on a low stool with your knee bent. After standing a lot, sit with your feet up. · Avoid fumes, chemicals, and tobacco smoke. Be sexual in your own way  · Having sex during pregnancy is okay, unless your doctor tells you not to. · You may be very interested in sex, or you may have no interest at all. · Your growing belly can make it hard to find a good position during intercourse. [place] and explore. · You may get cramps in your uterus when your partner touches your breasts.   · A back rub may relieve the backache or cramps that sometimes follow orgasm. Learn about  labor  · Watch for signs of  labor. You may be going into labor if:  ? You have menstrual-like cramps, with or without nausea. ? You have about 6 or more contractions in 1 hour, even after you have had a glass of water and are resting. ? You have a low, dull backache that does not go away when you change your position. ? You have pain or pressure in your pelvis that comes and goes in a pattern. ? You have intestinal cramping or flu-like symptoms, with or without diarrhea.  ? You notice an increase or change in your vaginal discharge. Discharge may be heavy, mucus-like, watery, or streaked with blood. ? Your water breaks. · If you think you have  labor:  ? Drink 2 or 3 glasses of water or juice. Not drinking enough fluids can cause contractions. ? Stop what you are doing, and empty your bladder. Then lie down on your left side for at least 1 hour. ? While lying on your side, find your breast bone. Put your fingers in the soft spot just below it. Move your fingers down toward your belly button to find the top of your uterus. Check to see if it is tight. ? Contractions can be weak or strong. Record your contractions for an hour. Time a contraction from the start of one contraction to the start of the next one.  ? Single or several strong contractions without a pattern are called Cannon-Ji contractions. They are practice contractions but not the start of labor. They often stop if you change what you are doing. ? Call your doctor if you have regular contractions. Where can you learn more? Go to http://ayaka-marie.info/. Enter E056 in the search box to learn more about \"Weeks 26 to 30 of Your Pregnancy: Care Instructions. \"  Current as of: 2017  Content Version: 11.8  © 0941-8474 [employer]. Care instructions adapted under license by [employer] (which disclaims liability or warranty for this information). If you have questions about a medical condition or this instruction, always ask your healthcare professional. [place] [address] any warranty or liability for your use of this information. Tdap (Tetanus, Diphtheria, Pertussis) Vaccine: What You Need to Know  Why get vaccinated? Tetanus, diphtheria, and pertussis are very serious diseases. Tdap vaccine can protect us from these diseases. And Tdap vaccine given to pregnant women can protect  babies against pertussis. Tetanus (lockjaw) is rare in the [facility] today. It causes painful muscle tightening and stiffness, usually all over the body. · It can lead to tightening of muscles in the head and neck so you can't open your mouth, swallow, or sometimes even breathe. Tetanus kills about 1 out of 10 people who are infected even after receiving the best medical care. Diphtheria is also rare in the [place] today. It can cause a thick coating to form in the back of the throat. · It can lead to breathing problems, heart failure, paralysis, and death. Pertussis (whooping cough) causes severe coughing spells, which can cause difficulty breathing, vomiting, and disturbed sleep. · It can also lead to weight loss, incontinence, and rib fractures. Up to 2 in 100 adolescents and 5 in 100 adults with pertussis are hospitalized or have complications, which could include pneumonia or death. These diseases are caused by bacteria. Diphtheria and pertussis are spread from person to person through secretions from coughing or sneezing. Tetanus enters the body through cuts, scratches, or wounds. Before vaccines, as many as 200,000 cases of diphtheria, 200,000 cases of pertussis, and hundreds of cases of tetanus were reported in the [place] each year. Since vaccination began, reports of cases for tetanus and diphtheria have dropped by about 99% and for pertussis by about 80%.   Tdap vaccine  The Tdap vaccine can protect adolescents and adults from tetanus, diphtheria, and pertussis. One dose of Tdap is routinely given at age 6 or 15. People who did not get Tdap at that age should get it as soon as possible. Tdap is especially important for health care professionals and anyone having close contact with a baby younger than 12 months. Pregnant women should get a dose of Tdap during every pregnancy, to protect the  from pertussis. Infants are most at risk for severe, life-threatening complications from pertussis. Another vaccine, called Td, protects against tetanus and diphtheria, but not pertussis. A Td booster should be given every 10 years. Tdap may be given as one of these boosters if you have never gotten Tdap before. Tdap may also be given after a severe cut or burn to prevent tetanus infection. Your doctor or the person giving you the vaccine can give you more information. Tdap may safely be given at the same time as other vaccines. Some people should not get this vaccine  · A person who has ever had a life-threatening allergic reaction after a previous dose of any diphtheria-, tetanus-, or pertussis-containing vaccine, OR has a severe allergy to any part of this vaccine, should not get Tdap vaccine. Tell the person giving the vaccine about any severe allergies. · Anyone who had coma or long repeated seizures within 7 days after a childhood dose of DTP or DTaP, or a previous dose of Tdap, should not get Tdap, unless a cause other than the vaccine was found. They can still get Td. · Talk to your doctor if you:  ? Have seizures or another nervous system problem. ? Had severe pain or swelling after any vaccine containing diphtheria, tetanus, or pertussis. ? Ever had a condition called Guillain-Barré Syndrome (GBS). ? Aren't feeling well on the day the shot is scheduled. Risks  With any medicine, including vaccines, there is a chance of side effects. These are usually mild and go away on their own.  Serious reactions are also possible but are rare. Most people who get Tdap vaccine do not have any problems with it. Mild problems following Tdap  (Did not interfere with activities)  · Pain where the shot was given (about 3 in 4 adolescents or 2 in 3 adults)  · Redness or swelling where the shot was given (about 1 person in 5)  · Mild fever of at least 100.4°F (up to about 1 in 25 adolescents or 1 in 100 adults)  · Headache (about 3 or 4 people in 10)  · Tiredness (about 1 person in 3 or 4)  · Nausea, vomiting, diarrhea, stomachache (up to 1 in 4 adolescents or 1 in 10 adults)  · Chills, sore joints (about 1 person in 10)  · Body aches (about 1 person in 3 or 4)  · Rash, swollen glands (uncommon)  Moderate problems following Tdap  (Interfered with activities, but did not require medical attention)  · Pain where the shot was given (up to 1 in 5 or 6)  · Redness or swelling where the shot was given (up to about 1 in 16 adolescents or 1 in 12 adults)  · Fever over 102°F (about 1 in 100 adolescents or 1 in 250 adults)  · Headache (about 1 in 7 adolescents or 1 in 10 adults)  · Nausea, vomiting, diarrhea, stomachache (up to 1 to 3 people in 100)  · Swelling of the entire arm where the shot was given (up to about 1 in 500)  Severe problems following Tdap  (Unable to perform usual activities; required medical attention)  · Swelling, severe pain, bleeding and redness in the arm where the shot was given (rare)  Problems that could happen after any vaccine:  · People sometimes faint after a medical procedure, including vaccination. Sitting or lying down for about 15 minutes can help prevent fainting, and injuries caused by a fall. Tell your doctor if you feel dizzy or have vision changes or ringing in the ears. · Some people get severe pain in the shoulder and have difficulty moving the arm where a shot was given. This happens very rarely. · Any medication can cause a severe allergic reaction.  Such reactions from a vaccine are very rare, estimated at fewer than 1 in a million doses, and would happen within a few minutes to a few hours after the vaccination. As with any medicine, there is a very remote chance of a vaccine causing a serious injury or death. The safety of vaccines is always being monitored. For more information, visit: www.cdc.gov/vaccinesafety. What if there is a serious problem? What should I look for? · Look for anything that concerns you, such as signs of a severe allergic reaction, very high fever, or unusual behavior. Signs of a severe allergic reaction can include hives, swelling of the face and throat, difficulty breathing, a fast heartbeat, dizziness, and weakness. These would usually start a few minutes to a few hours after the vaccination. What should I do? · If you think it is a severe allergic reaction or other emergency that can't wait, call 9-1-1 or get the person to the nearest hospital. Otherwise, call your doctor. · Afterward, the reaction should be reported to the Vaccine Adverse Event Reporting System (VAERS). Your doctor might file this report, or you can do it yourself through the VAERS web site at www.vaers. hhs.gov, or by calling 9-213.363.2485. VAERS does not give medical advice. The National Vaccine Injury Compensation Program  The National Vaccine Injury Compensation Program (VICP) is a federal program that was created to compensate people who may have been injured by certain vaccines. Persons who believe they may have been injured by a vaccine can learn about the program and about filing a claim by calling 6-926.428.8070 or visiting the [facility] [employer] website at www.[facility].gov/vaccinecompensation. There is a time limit to file a claim for compensation. How can I learn more? · Ask your doctor. He or she can give you the vaccine package insert or suggest other sources of information. · Call your local or state health department. · Contact the Centers for Disease Control and Prevention (CDC):  ?  Call 6-163.592.1189 (6-379-CEW-INFO) or  ? Visit CDC's website at www.cdc.gov/vaccines  Vaccine Information Statement (Interim)  Tdap Vaccine  (2/24/15)  42 SHERIE Mooney Scarlet 004QF-[id number]  Department of Health and Human Services  Centers for Disease Control and Prevention  Many Vaccine Information Statements are available in Yakut and other languages. See www.immunize.org/vis. Muchas hojas de información sobre vacunas están disponibles en español y en otros idiomas. Visite www.immunize.org/vis. Care instructions adapted under license by [employer] (which disclaims liability or warranty for this information). If you have questions about a medical condition or this instruction, always ask your healthcare professional. [address] any warranty or liability for your use of this information.

## 2019-01-04 NOTE — PROGRESS NOTES
31w0d  Dated by 8 weeks  PROM--> binu declined  LEEP--> s/p CL with EVMS, cone in 2000  AMA--> Jay done  See flow sheet  Tdap vaccine today  Flu vaccine done  Reviewed labs and imaging  1 hour , Rh +, Hgb 13.2  RTC 2 weeks

## 2019-01-04 NOTE — PROGRESS NOTES
Patient to receive TDAP this visit, given in LEFT Deltoid, VIS statement given, patient tolerated well.

## 2019-01-21 ENCOUNTER — ROUTINE PRENATAL (OUTPATIENT)
Dept: OBGYN CLINIC | Age: 34
End: 2019-01-21

## 2019-01-21 VITALS
OXYGEN SATURATION: 99 % | SYSTOLIC BLOOD PRESSURE: 118 MMHG | DIASTOLIC BLOOD PRESSURE: 74 MMHG | HEIGHT: 63 IN | HEART RATE: 99 BPM | RESPIRATION RATE: 18 BRPM | WEIGHT: 157.8 LBS | BODY MASS INDEX: 27.96 KG/M2 | TEMPERATURE: 97.2 F

## 2019-01-21 DIAGNOSIS — Z34.03 ENCOUNTER FOR SUPERVISION OF NORMAL FIRST PREGNANCY IN THIRD TRIMESTER: Primary | ICD-10-CM

## 2019-01-21 NOTE — PROGRESS NOTES
33w3d  Dated by 8 weeks  PROM--> binu declined  LEEP--> s/p CL with EVMS, cone in 2000  AMA--> Jay done  No obstetrical complaints  See flow sheet  Tdap vaccine done  Flu vaccine done  Reviewed labs and imaging  1 hour , Rh +, Hgb 13.2  PTL precautions  RTC 2 weeks

## 2019-01-21 NOTE — PATIENT INSTRUCTIONS
Weeks 32 to 34 of Your Pregnancy: Care Instructions  Your Care Instructions    During the last few weeks of your pregnancy, you may have more aches and pains. It's important to rest when you can. Your growing baby is putting more pressure on your bladder. So you may need to urinate more often. Hemorrhoids are also common. These are painful, itchy veins in the rectal area. In the 36th week, most women have a test for group B streptococcus (GBS). GBS is a common bacteria that can live in the vagina and rectum. It can make your baby sick after birth. If you test positive, you will get antibiotics during labor. These will keep your baby from getting the bacteria. You may want to talk with your doctor about banking your baby's umbilical cord blood. This is the blood left in the cord after birth. If you want to save this blood, you must arrange it ahead of time. You can't decide at the last minute. If you haven't already had the Tdap shot during this pregnancy, talk to your doctor about getting it. It will help protect your  against pertussis infection. Follow-up care is a key part of your treatment and safety. Be sure to make and go to all appointments, and call your doctor if you are having problems. It's also a good idea to know your test results and keep a list of the medicines you take. How can you care for yourself at home? Ease hemorrhoids  · Get more liquids, fruits, vegetables, and fiber in your diet. This will help keep your stools soft. · Avoid sitting for too long. Lie on your left side several times a day. · Clean yourself with soft, moist toilet paper. Or you can use witch hazel pads or personal hygiene pads. · If you are uncomfortable, try ice packs. Or you can sit in a warm sitz bath. Do these for 20 minutes at a time, as needed. · Use hydrocortisone cream for pain and itching. Two examples are Anusol and Preparation H Hydrocortisone.   · Ask your doctor about taking an over-the-counter stool softener. Consider breastfeeding  · Experts recommend that women breastfeed for 1 year or longer. Breast milk is the perfect food for babies. · Breast milk is easier for babies to digest than formula. And it is always available, just the right temperature, and free. · Breast milk may help protect your child from some health problems.  babies are less likely than formula-fed babies to:  ? Get ear infections, colds, diarrhea, and pneumonia. ? Be obese or get diabetes later in life. · Women who breastfeed have less bleeding after the birth. Their uteruses also shrink back faster. · Some women who breastfeed lose weight faster. Making milk burns calories. · Breastfeeding can lower your risk of breast cancer, ovarian cancer, and osteoporosis. Decide about circumcision for boys  · As you make this decision, it may help to think about your personal, Jainism, and family traditions. You get to decide if you will keep your son's penis natural or if he will be circumcised. · If you decide that you would like to have your baby circumcised, talk with your doctor. You can share your concerns about pain. And you can discuss your preferences for anesthesia. Where can you learn more? Go to http://ayaka-marie.info/. Enter L699 in the search box to learn more about \"Weeks 32 to 34 of Your Pregnancy: Care Instructions. \"  Current as of: September 5, 2018  Content Version: 11.9  © 7087-0049 [employer]. Care instructions adapted under license by [employer] (which disclaims liability or warranty for this information). If you have questions about a medical condition or this instruction, always ask your healthcare professional. [place] [address] any warranty or liability for your use of this information.          Learning About When to Call Your Doctor During Pregnancy (After 20 Weeks)  Your Care Instructions  It's common to have concerns about what might be a problem during pregnancy. Although most pregnant women don't have any serious problems, it's important to know when to call your doctor if you have certain symptoms or signs of labor. These are general suggestions. Your doctor may give you some more information about when to call. When to call your doctor (after 20 weeks)  Call 911 anytime you think you may need emergency care. For example, call if:  · You have severe vaginal bleeding. · You have sudden, severe pain in your belly. · You passed out (lost consciousness). · You have a seizure. · You see or feel the umbilical cord. · You think you are about to deliver your baby and can't make it safely to the hospital.  Call your doctor now or seek immediate medical care if:  · You have vaginal bleeding. · You have belly pain. · You have a fever. · You have symptoms of preeclampsia, such as:  ? Sudden swelling of your face, hands, or feet. ? New vision problems (such as dimness or blurring). ? A severe headache. · You have a sudden release of fluid from your vagina. (You think your water broke.)  · You think that you may be in labor. This means that you've had at least 4 contractions within 20 minutes or at least 8 contractions in an hour. · You notice that your baby has stopped moving or is moving much less than normal.  · You have symptoms of a urinary tract infection. These may include:  ? Pain or burning when you urinate. ? A frequent need to urinate without being able to pass much urine. ? Pain in the flank, which is just below the rib cage and above the waist on either side of the back. ? Blood in your urine. Watch closely for changes in your health, and be sure to contact your doctor if:  · You have vaginal discharge that smells bad. · You have skin changes, such as:  ? A rash. ? Itching. ? Yellow color to your skin. · You have other concerns about your pregnancy.   If you have labor signs at 37 weeks or more  If you have signs of labor at 37 weeks or more, your doctor may tell you to call when your labor becomes more active. Symptoms of active labor include:  · Contractions that are regular. · Contractions that are less than 5 minutes apart. · Contractions that are hard to talk through. Follow-up care is a key part of your treatment and safety. Be sure to make and go to all appointments, and call your doctor if you are having problems. It's also a good idea to know your test results and keep a list of the medicines you take. Where can you learn more? Go to http://ayaka-marie.info/. Enter  in the search box to learn more about \"Learning About When to Call Your Doctor During Pregnancy (After 20 Weeks). \"  Current as of: September 5, 2018  Content Version: 11.9  © 9246-5115 [employer]. Care instructions adapted under license by [employer] (which disclaims liability or warranty for this information). If you have questions about a medical condition or this instruction, always ask your healthcare professional. [address] any warranty or liability for your use of this information.

## 2019-02-01 NOTE — PROGRESS NOTES
21w0d  Dated by 8 weeks  PROM--> binu ordered but pt canceled  LEEP--> CL with EVMS  AMA--> Jay done  Voided before urine sample obtained  Urine cx at next visit  See flow sheet  Reviewed labs and imaging  AFP negative  Anatomy scan scheduled with EVMS  Flu vaccine-done  RTC 4 weeks

## 2019-02-01 NOTE — PATIENT INSTRUCTIONS
Weeks 22 to 26 of Your Pregnancy: Care Instructions  Your Care Instructions    As you enter your 7th month of pregnancy at week 26, your baby's lungs are growing stronger and getting ready to breathe. You may notice that your baby responds to the sound of your or your partner's voice. You may also notice that your baby does less turning and twisting and more squirming or jerking. Jerking often means that your baby has the hiccups. Hiccups are perfectly normal and are only temporary. You may want to think about attending a childbirth preparation class. This is also a good time to start thinking about whether you want to have pain medicine during labor. Most pregnant women are tested for gestational diabetes between weeks 25 and 28. Gestational diabetes occurs when your blood sugar level gets too high when you're pregnant. The test is important, because you can have gestational diabetes and not know it. But the condition can cause problems for your baby. Follow-up care is a key part of your treatment and safety. Be sure to make and go to all appointments, and call your doctor if you are having problems. It's also a good idea to know your test results and keep a list of the medicines you take. How can you care for yourself at home? Ease discomfort from your baby's kicking  · Change your position. Sometimes this will cause your baby to change position too. · Take a deep breath while you raise your arm over your head. Then breathe out while you drop your arm. Do Kegel exercises to prevent urine from leaking  · You can do Kegel exercises while you stand or sit. ? Squeeze the same muscles you would use to stop your urine. Your belly and thighs should not move. ? Hold the squeeze for 3 seconds, and then relax for 3 seconds. ? Start with 3 seconds. Then add 1 second each week until you are able to squeeze for 10 seconds. ? Repeat the exercise 10 to 15 times for each session.  Do three or more sessions each day.  Ease or reduce swelling in your feet, ankles, hands, and fingers  · If your fingers are puffy, take off your rings. · Do not eat high-salt foods, such as potato chips. · Prop up your feet on a stool or couch as much as possible. Sleep with pillows under your feet. · Do not stand for long periods of time or wear tight shoes. · Wear support stockings. Where can you learn more? Go to http://ayaka-marie.info/. Enter G264 in the search box to learn more about \"Weeks 22 to 26 of Your Pregnancy: Care Instructions. \"  Current as of: September 5, 2018  Content Version: 11.9  © 8785-6210 [employer]. Care instructions adapted under license by [employer] (which disclaims liability or warranty for this information). If you have questions about a medical condition or this instruction, always ask your healthcare professional. [place] [address] any warranty or liability for your use of this information.

## 2019-02-04 ENCOUNTER — HOSPITAL ENCOUNTER (OUTPATIENT)
Dept: LAB | Age: 34
Discharge: HOME | End: 2019-02-04
Payer: COMMERCIAL

## 2019-02-04 ENCOUNTER — ROUTINE PRENATAL (OUTPATIENT)
Dept: OBGYN CLINIC | Age: 34
End: 2019-02-04

## 2019-02-04 VITALS
SYSTOLIC BLOOD PRESSURE: 126 MMHG | BODY MASS INDEX: 28.14 KG/M2 | HEIGHT: 63 IN | HEART RATE: 92 BPM | TEMPERATURE: 97.5 F | OXYGEN SATURATION: 96 % | WEIGHT: 158.8 LBS | DIASTOLIC BLOOD PRESSURE: 76 MMHG

## 2019-02-04 DIAGNOSIS — Z98.890 HISTORY OF LEEP (LOOP ELECTROSURGICAL EXCISION PROCEDURE) OF CERVIX COMPLICATING PREGNANCY, FIRST TRIMESTER: ICD-10-CM

## 2019-02-04 DIAGNOSIS — O09.523 ENCOUNTER FOR SUPERVISION OF MULTIGRAVIDA OF ADVANCED MATERNAL AGE IN THIRD TRIMESTER: ICD-10-CM

## 2019-02-04 DIAGNOSIS — O34.41 HISTORY OF LEEP (LOOP ELECTROSURGICAL EXCISION PROCEDURE) OF CERVIX COMPLICATING PREGNANCY, FIRST TRIMESTER: ICD-10-CM

## 2019-02-04 DIAGNOSIS — Z3A.35 35 WEEKS GESTATION OF PREGNANCY: ICD-10-CM

## 2019-02-04 DIAGNOSIS — O09.523 ENCOUNTER FOR SUPERVISION OF MULTIGRAVIDA OF ADVANCED MATERNAL AGE IN THIRD TRIMESTER: Primary | ICD-10-CM

## 2019-02-04 DIAGNOSIS — O09.291 HISTORY OF PROM (PREMATURE RUPTURE OF MEMBRANES), CURRENTLY PREGNANT, FIRST TRIMESTER: ICD-10-CM

## 2019-02-04 PROCEDURE — 87491 CHLMYD TRACH DNA AMP PROBE: CPT

## 2019-02-04 PROCEDURE — 87081 CULTURE SCREEN ONLY: CPT

## 2019-02-04 PROCEDURE — 87077 CULTURE AEROBIC IDENTIFY: CPT

## 2019-02-04 NOTE — PROGRESS NOTES
35w3d  Dated by 8 weeks  PROM--> binu declined  LEEP--> s/p CL with EVMS, cone in 2000  AMA--> Jay done  See flow sheet  Reviewed labs and imaging  GBS/GC/Chl/TV today  R/B/A of induction vaginal delivery or C/S  discussed including infection, bleeding, blood transfusion, injury to internal organs, baby, operative vaginal delivery and shoulder dystocia with increased morbidity and mortality. All of her questions answered.   RTC 1 weeks

## 2019-02-04 NOTE — PATIENT INSTRUCTIONS
Weeks 34 to 36 of Your Pregnancy: Care Instructions  Your Care Instructions    By now, your baby and your belly have grown quite large. It is almost time to give birth. A full-term pregnancy can deliver between 37 and 42 weeks. Your baby's lungs are almost ready to breathe air. The bones in your baby's head are now firm enough to protect it, but soft enough to move down through the birth canal.  You may feel excited, happy, anxious, or scared. You may wonder how you will know if you are in labor or what to expect during labor. Try to be flexible in your expectations of the birth. Because each birth is different, there is no way to know exactly what childbirth will be like for you. This care sheet will help you know what to expect and how to prepare. This may make your childbirth easier. If you haven't already had the Tdap shot during this pregnancy, talk to your doctor about getting it. It will help protect your  against pertussis infection. In the 36th week, most women have a test for group B streptococcus (GBS). GBS is a common bacteria that can live in the vagina and rectum. It can make your baby sick after birth. If you test positive, you will get antibiotics during labor. The medicine will keep your baby from getting the bacteria. Follow-up care is a key part of your treatment and safety. Be sure to make and go to all appointments, and call your doctor if you are having problems. It's also a good idea to know your test results and keep a list of the medicines you take. How can you care for yourself at home? Learn about pain relief choices  · Pain is different for every woman. Talk with your doctor about your feelings about pain. · You can choose from several types of pain relief. These include medicine or breathing techniques, as well as comfort measures. You can use more than one option. · If you choose to have pain medicine during labor, talk to your doctor about your options.  Some medicines lower anxiety and help with some of the pain. Others make your lower body numb so that you won't feel pain. · Be sure to tell your doctor about your pain medicine choice before you start labor or very early in your labor. You may be able to change your mind as labor progresses. · Rarely, a woman is put to sleep by medicine given through a mask or an IV. Labor and delivery  · The first stage of labor has three parts: early, active, and transition. ? Most women have early labor at home. You can stay busy or rest, eat light snacks, drink clear fluids, and start counting contractions. ? When talking during a contraction gets hard, you may be moving to active labor. During active labor, you should head for the hospital if you are not there already. ? You are in active labor when contractions come every 3 to 4 minutes and last about 60 seconds. Your cervix is opening more rapidly. ? If your water breaks, contractions will come faster and stronger. ? During transition, your cervix is stretching, and contractions are coming more rapidly. ? You may want to push, but your cervix might not be ready. Your doctor will tell you when to push. · The second stage starts when your cervix is completely opened and you are ready to push. ? Contractions are very strong to push the baby down the birth canal.  ? You will feel the urge to push. You may feel like you need to have a bowel movement. ? You may be coached to push with contractions. These contractions will be very strong, but you will not have them as often. You can get a little rest between contractions. ? You may be emotional and irritable. You may not be aware of what is going on around you.  ? One last push, and your baby is born. · The third stage is when a few more contractions push out the placenta. This may take 30 minutes or less. · The fourth stage is the welcome recovery. You may feel overwhelmed with emotions and exhausted but alert.  This is a good time to start breastfeeding. Where can you learn more? Go to http://ayaka-marie.info/. Enter P319 in the search box to learn more about \"Weeks 34 to 36 of Your Pregnancy: Care Instructions. \"  Current as of: 2018  Content Version: 11.9  © 5501-0906 [employer]. Care instructions adapted under license by [employer] (which disclaims liability or warranty for this information). If you have questions about a medical condition or this instruction, always ask your healthcare professional. [address] any warranty or liability for your use of this information. Labor Induction: Care Instructions  Your Care Instructions  If you pass your due date and your labor does not start on its own, your doctor may want to try to start (induce) labor. Your doctor may suggest doing this for other reasons. It may be a good idea to induce labor if you have another problem. For example, it may be done if you have high blood pressure. Or it may be a good idea if the placenta can no longer give enough support to the baby. There are several ways to induce labor. · Medicine may be used to make the cervix soft and help it thin. · Medicine may be used to cause the uterus to contract. · A balloon catheter may be used to help the cervix open. · Your doctor may sweep the membranes or break the amniotic sac to start or increase labor. This may be done if your cervix is soft and slightly open. You may need to have your baby delivered through a cut (incision) in your belly. This is called a  section, or a . It may be done if your doctor has used medicine but your labor is not progressing. After you have your baby, you should not have any side effects from the medicine used to start labor. Follow-up care is a key part of your treatment and safety.  Be sure to make and go to all appointments, and call your doctor if you are having problems. It's also a good idea to know your test results and keep a list of the medicines you take. When should your labor be induced? · Your pregnancy has gone 1 to 2 weeks past your expected due date. · You have a problem that may harm your health or the health of your baby if you continue to be pregnant. This includes high blood pressure, preeclampsia, and diabetes. · Your water breaks, but labor does not start. When should you call for help? Watch closely for changes in your health, and be sure to contact your doctor if you have questions about inducing labor. Where can you learn more? Go to http://ayaka-marie.info/. Enter W373 in the search box to learn more about \"Labor Induction: Care Instructions. \"  Current as of: September 5, 2018  Content Version: 11.9  © 2402-8279 [employer]. Care instructions adapted under license by [employer] (which disclaims liability or warranty for this information). If you have questions about a medical condition or this instruction, always ask your healthcare professional. [place] [address] any warranty or liability for your use of this information.

## 2019-02-06 LAB
BACTERIA SPEC CULT: ABNORMAL
BACTERIA SPEC CULT: ABNORMAL
C TRACH RRNA SPEC QL NAA+PROBE: NEGATIVE
N GONORRHOEA RRNA SPEC QL NAA+PROBE: NEGATIVE
SERVICE CMNT-IMP: ABNORMAL
SPECIMEN SOURCE: NORMAL
T VAGINALIS RRNA SPEC QL NAA+PROBE: NEGATIVE

## 2019-02-07 NOTE — PROGRESS NOTES
Dear Ms. Preeti Bradley Just wanted to let you know that your GBS was positive so we will treat you with antibiotics when you are in labor. We will review it in more detail at your next appt on 2/11/2019  8:00 AM     
 
Have a great day, 
 
Dr. Arline Thompson

## 2019-02-11 ENCOUNTER — ROUTINE PRENATAL (OUTPATIENT)
Dept: OBGYN CLINIC | Age: 34
End: 2019-02-11

## 2019-02-11 VITALS
DIASTOLIC BLOOD PRESSURE: 76 MMHG | OXYGEN SATURATION: 98 % | HEART RATE: 93 BPM | WEIGHT: 158 LBS | SYSTOLIC BLOOD PRESSURE: 116 MMHG | HEIGHT: 63 IN | BODY MASS INDEX: 28 KG/M2 | TEMPERATURE: 98 F

## 2019-02-11 DIAGNOSIS — O09.291 HISTORY OF PROM (PREMATURE RUPTURE OF MEMBRANES), CURRENTLY PREGNANT, FIRST TRIMESTER: ICD-10-CM

## 2019-02-11 DIAGNOSIS — O09.523 ENCOUNTER FOR SUPERVISION OF MULTIGRAVIDA OF ADVANCED MATERNAL AGE IN THIRD TRIMESTER: Primary | ICD-10-CM

## 2019-02-11 DIAGNOSIS — Z98.890 HISTORY OF LEEP (LOOP ELECTROSURGICAL EXCISION PROCEDURE) OF CERVIX COMPLICATING PREGNANCY, FIRST TRIMESTER: ICD-10-CM

## 2019-02-11 DIAGNOSIS — Z3A.36 36 WEEKS GESTATION OF PREGNANCY: ICD-10-CM

## 2019-02-11 DIAGNOSIS — O34.41 HISTORY OF LEEP (LOOP ELECTROSURGICAL EXCISION PROCEDURE) OF CERVIX COMPLICATING PREGNANCY, FIRST TRIMESTER: ICD-10-CM

## 2019-02-11 NOTE — PROGRESS NOTES
36w3d  Dated by 8 weeks  PROM--> binu declined  LEEP--> s/p CL with EVMS, cone in 2000  AMA--> Jay done  IOL 3/11  See flow sheet  Reviewed labs and imaging  GBS pos, GC/Chl/TV neg  Seen with Dr. Vesna Arita  RTC 1 weeks

## 2019-02-11 NOTE — PATIENT INSTRUCTIONS
Group B Strep During Pregnancy: Care Instructions  Your Care Instructions    Group B strep infection is caused by a type of bacteria. It's a different kind of bacteria than the kind that causes strep throat. You may have this kind of bacteria in your body. Sometimes it may cause an infection, but most of the time it doesn't make you sick or cause symptoms. But if you pass the bacteria to your baby during the birth, it can cause serious health problems for your baby. If you have this bacteria in your body, you will get antibiotics when you are in labor. Antibiotics help prevent problems for a  baby. After birth, doctors will watch and may test your baby. If your baby tests positive for Group B strep, he or she will get antibiotics. If you plan to breastfeed your baby, don't worry. It will be safe to breastfeed. Follow-up care is a key part of your treatment and safety. Be sure to make and go to all appointments, and call your doctor if you are having problems. It's also a good idea to know your test results and keep a list of the medicines you take. How can you care for yourself at home? · If your doctor has prescribed antibiotics, take them as directed. Do not stop taking them just because you feel better. You need to take the full course of antibiotics. · Tell your doctor if you are allergic to any antibiotic. · If your water breaks, go to the hospital right away. Your doctor will give you antibiotics to help protect your baby from infection. · Tell the doctors and nurses at the hospital that you tested positive for group B strep. When should you call for help? Call your doctor now or seek immediate medical care if:    · You have symptoms of a urinary tract infection. These may include:  ? Pain or burning when you urinate. ? A frequent need to urinate without being able to pass much urine.   ? Pain in the flank, which is just below the rib cage and above the waist on either side of the back.  ? Blood in your urine. ? A fever.     · You think your water has broken.     · You have pain in your belly or pelvis.    Watch closely for changes in your health, and be sure to contact your doctor if you have any problems. Where can you learn more? Go to http://ayaka-marie.info/. Enter M001 in the search box to learn more about \"Group B Strep During Pregnancy: Care Instructions. \"  Current as of: 2018  Content Version: 11.9  © 3874-9398 [employer]. Care instructions adapted under license by [employer] (which disclaims liability or warranty for this information). If you have questions about a medical condition or this instruction, always ask your healthcare professional. [address] any warranty or liability for your use of this information. Weeks 34 to 36 of Your Pregnancy: Care Instructions  Your Care Instructions    By now, your baby and your belly have grown quite large. It is almost time to give birth. A full-term pregnancy can deliver between 37 and 42 weeks. Your baby's lungs are almost ready to breathe air. The bones in your baby's head are now firm enough to protect it, but soft enough to move down through the birth canal.  You may feel excited, happy, anxious, or scared. You may wonder how you will know if you are in labor or what to expect during labor. Try to be flexible in your expectations of the birth. Because each birth is different, there is no way to know exactly what childbirth will be like for you. This care sheet will help you know what to expect and how to prepare. This may make your childbirth easier. If you haven't already had the Tdap shot during this pregnancy, talk to your doctor about getting it. It will help protect your  against pertussis infection. In the 36th week, most women have a test for group B streptococcus (GBS).  GBS is a common bacteria that can live in the vagina and rectum. It can make your baby sick after birth. If you test positive, you will get antibiotics during labor. The medicine will keep your baby from getting the bacteria. Follow-up care is a key part of your treatment and safety. Be sure to make and go to all appointments, and call your doctor if you are having problems. It's also a good idea to know your test results and keep a list of the medicines you take. How can you care for yourself at home? Learn about pain relief choices  · Pain is different for every woman. Talk with your doctor about your feelings about pain. · You can choose from several types of pain relief. These include medicine or breathing techniques, as well as comfort measures. You can use more than one option. · If you choose to have pain medicine during labor, talk to your doctor about your options. Some medicines lower anxiety and help with some of the pain. Others make your lower body numb so that you won't feel pain. · Be sure to tell your doctor about your pain medicine choice before you start labor or very early in your labor. You may be able to change your mind as labor progresses. · Rarely, a woman is put to sleep by medicine given through a mask or an IV. Labor and delivery  · The first stage of labor has three parts: early, active, and transition. ? Most women have early labor at home. You can stay busy or rest, eat light snacks, drink clear fluids, and start counting contractions. ? When talking during a contraction gets hard, you may be moving to active labor. During active labor, you should head for the hospital if you are not there already. ? You are in active labor when contractions come every 3 to 4 minutes and last about 60 seconds. Your cervix is opening more rapidly. ? If your water breaks, contractions will come faster and stronger. ? During transition, your cervix is stretching, and contractions are coming more rapidly.   ? You may want to push, but your cervix might not be ready. Your doctor will tell you when to push. · The second stage starts when your cervix is completely opened and you are ready to push. ? Contractions are very strong to push the baby down the birth canal.  ? You will feel the urge to push. You may feel like you need to have a bowel movement. ? You may be coached to push with contractions. These contractions will be very strong, but you will not have them as often. You can get a little rest between contractions. ? You may be emotional and irritable. You may not be aware of what is going on around you.  ? One last push, and your baby is born. · The third stage is when a few more contractions push out the placenta. This may take 30 minutes or less. · The fourth stage is the welcome recovery. You may feel overwhelmed with emotions and exhausted but alert. This is a good time to start breastfeeding. Where can you learn more? Go to http://ayaka-marie.info/. Enter Y198 in the search box to learn more about \"Weeks 34 to 36 of Your Pregnancy: Care Instructions. \"  Current as of: September 5, 2018  Content Version: 11.9  © 0660-3278 [employer]. Care instructions adapted under license by [employer] (which disclaims liability or warranty for this information). If you have questions about a medical condition or this instruction, always ask your healthcare professional. [address] any warranty or liability for your use of this information.

## 2019-02-20 ENCOUNTER — ROUTINE PRENATAL (OUTPATIENT)
Dept: OBGYN CLINIC | Age: 34
End: 2019-02-20

## 2019-02-20 VITALS
DIASTOLIC BLOOD PRESSURE: 75 MMHG | OXYGEN SATURATION: 98 % | HEART RATE: 83 BPM | SYSTOLIC BLOOD PRESSURE: 113 MMHG | TEMPERATURE: 98.7 F

## 2019-02-20 DIAGNOSIS — O09.291 HISTORY OF PROM (PREMATURE RUPTURE OF MEMBRANES), CURRENTLY PREGNANT, FIRST TRIMESTER: ICD-10-CM

## 2019-02-20 DIAGNOSIS — Z98.890 HISTORY OF LEEP (LOOP ELECTROSURGICAL EXCISION PROCEDURE) OF CERVIX COMPLICATING PREGNANCY, FIRST TRIMESTER: ICD-10-CM

## 2019-02-20 DIAGNOSIS — O34.41 HISTORY OF LEEP (LOOP ELECTROSURGICAL EXCISION PROCEDURE) OF CERVIX COMPLICATING PREGNANCY, FIRST TRIMESTER: ICD-10-CM

## 2019-02-20 DIAGNOSIS — Z3A.37 37 WEEKS GESTATION OF PREGNANCY: ICD-10-CM

## 2019-02-20 DIAGNOSIS — O09.523 ENCOUNTER FOR SUPERVISION OF MULTIGRAVIDA OF ADVANCED MATERNAL AGE IN THIRD TRIMESTER: Primary | ICD-10-CM

## 2019-02-20 NOTE — PATIENT INSTRUCTIONS

## 2019-02-20 NOTE — PROGRESS NOTES
37w5d  Dated by 8 weeks  PROM--> binu declined  LEEP--> s/p CL with EVMS, cone in 2000  AMA--> Jay done  IOL 3/11  See flow sheet  Reviewed labs and imaging  GBS pos, GC/Chl/TVneg  RTC 1 weeks

## 2019-02-26 ENCOUNTER — ROUTINE PRENATAL (OUTPATIENT)
Dept: OBGYN CLINIC | Age: 34
End: 2019-02-26

## 2019-02-26 VITALS
TEMPERATURE: 98.6 F | HEIGHT: 63 IN | DIASTOLIC BLOOD PRESSURE: 72 MMHG | HEART RATE: 62 BPM | OXYGEN SATURATION: 98 % | SYSTOLIC BLOOD PRESSURE: 114 MMHG | BODY MASS INDEX: 28.7 KG/M2 | WEIGHT: 162 LBS

## 2019-02-26 DIAGNOSIS — O09.293: ICD-10-CM

## 2019-02-26 DIAGNOSIS — Z3A.38 38 WEEKS GESTATION OF PREGNANCY: ICD-10-CM

## 2019-02-26 DIAGNOSIS — O09.523 ENCOUNTER FOR SUPERVISION OF MULTIGRAVIDA OF ADVANCED MATERNAL AGE IN THIRD TRIMESTER: Primary | ICD-10-CM

## 2019-02-26 DIAGNOSIS — O34.41 HISTORY OF LEEP (LOOP ELECTROSURGICAL EXCISION PROCEDURE) OF CERVIX COMPLICATING PREGNANCY, FIRST TRIMESTER: ICD-10-CM

## 2019-02-26 DIAGNOSIS — Z98.890 HISTORY OF LEEP (LOOP ELECTROSURGICAL EXCISION PROCEDURE) OF CERVIX COMPLICATING PREGNANCY, FIRST TRIMESTER: ICD-10-CM

## 2019-02-26 NOTE — PATIENT INSTRUCTIONS

## 2019-02-26 NOTE — PROGRESS NOTES
38w4d  Dated by 8 weeks  PROM--> binu declined  LEEP--> s/p CL with EVMS, cone in 2000  AMA--> Jay done  See flow sheet  Reviewed labs and imaging  GBS pos  IOL 3/11  RTC 1 weeks

## 2019-03-05 ENCOUNTER — ROUTINE PRENATAL (OUTPATIENT)
Dept: OBGYN CLINIC | Age: 34
End: 2019-03-05

## 2019-03-05 VITALS
SYSTOLIC BLOOD PRESSURE: 126 MMHG | HEIGHT: 63 IN | DIASTOLIC BLOOD PRESSURE: 84 MMHG | WEIGHT: 162.6 LBS | HEART RATE: 86 BPM | BODY MASS INDEX: 28.81 KG/M2 | TEMPERATURE: 98 F

## 2019-03-05 DIAGNOSIS — Z98.890 HISTORY OF LEEP (LOOP ELECTROSURGICAL EXCISION PROCEDURE) OF CERVIX COMPLICATING PREGNANCY, FIRST TRIMESTER: ICD-10-CM

## 2019-03-05 DIAGNOSIS — O34.41 HISTORY OF LEEP (LOOP ELECTROSURGICAL EXCISION PROCEDURE) OF CERVIX COMPLICATING PREGNANCY, FIRST TRIMESTER: ICD-10-CM

## 2019-03-05 DIAGNOSIS — O09.523 ENCOUNTER FOR SUPERVISION OF MULTIGRAVIDA OF ADVANCED MATERNAL AGE IN THIRD TRIMESTER: Primary | ICD-10-CM

## 2019-03-05 DIAGNOSIS — Z3A.39 39 WEEKS GESTATION OF PREGNANCY: ICD-10-CM

## 2019-03-05 DIAGNOSIS — O09.293: ICD-10-CM

## 2019-03-05 NOTE — PATIENT INSTRUCTIONS
Week 39 of Your Pregnancy: Care Instructions  Your Care Instructions    During these final weeks, you may feel anxious to see your new baby. [place] babies often look different from what you see in pictures or movies. Right after birth, their heads may have a strange shape. Their eyes may be puffy. And their genitals may be swollen. They may also have very dry skin, or red marks on the eyelids, nose, or neck. Still, most parents think their babies are beautiful. Follow-up care is a key part of your treatment and safety. Be sure to make and go to all appointments, and call your doctor if you are having problems. It's also a good idea to know your test results and keep a list of the medicines you take. How can you care for yourself at home? Prepare to breastfeed  · If you are breastfeeding, continue to eat healthy foods. · If your health care provider recommends it, keep taking your prenatal vitamins. · Talk to your doctor before you take any medicine or supplement. That's because some medicines can affect your breast milk. · You can help prevent sore nipples if you feed your baby in the correct position. Nurses will help you learn to do this. · Your  will need to be fed about every 1 to 3 hours. Choose the right birth control after your baby is born  · Women who are breastfeeding can still get pregnant. Use birth control if you don't want to get pregnant. · Intrauterine devices (IUDs) work for women who want to wait at least 2 years before getting pregnant again. They are safe to use while you are breastfeeding. · Depo-Provera can be used while you are breastfeeding. It is a shot you get every 3 months. · Birth control pills work well. But you need a different kind of pill while you are breastfeeding. And when you start taking these pills, you need to make sure to use another type of birth control until you start your second pack.   · Diaphragms, cervical caps, tubal implants, and condoms with spermicide work less well after birth. If you have a diaphragm or cervical cap, you will need to have it refitted. · Tubal ligation (tying your tubes) and vasectomy are both permanent. These are good options if you are sure you are done having children. Where can you learn more? Go to http://ayaka-marie.info/. Enter N255 in the search box to learn more about \"Week 39 of Your Pregnancy: Care Instructions. \"  Current as of: September 5, 2018  Content Version: 11.9  © 9661-9265 [employer]. Care instructions adapted under license by [employer] (which disclaims liability or warranty for this information). If you have questions about a medical condition or this instruction, always ask your healthcare professional. [address] any warranty or liability for your use of this information.

## 2019-03-05 NOTE — PROGRESS NOTES
39w4d  Dated by 8 weeks  PROM--> binu declined  LEEP--> s/p CL with EVMS, cone in 2000  AMA--> Jay done  See flow sheet  Reviewed labs and imaging  GBS pos  RTC 3 weeks

## 2019-03-06 ENCOUNTER — HOSPITAL ENCOUNTER (INPATIENT)
Age: 34
LOS: 2 days | Discharge: HOME | End: 2019-03-08
Attending: OBSTETRICS & GYNECOLOGY | Admitting: OBSTETRICS & GYNECOLOGY
Payer: COMMERCIAL

## 2019-03-06 PROBLEM — Z37.9 NORMAL LABOR: Status: ACTIVE | Noted: 2019-03-06

## 2019-03-06 LAB
ABO + RH BLD: NORMAL
BASOPHILS # BLD: 0 K/UL (ref 0–0.1)
BASOPHILS NFR BLD: 0 % (ref 0–2)
BLOOD GROUP ANTIBODIES SERPL: NORMAL
DIFFERENTIAL METHOD BLD: ABNORMAL
EOSINOPHIL # BLD: 0.1 K/UL (ref 0–0.4)
EOSINOPHIL NFR BLD: 1 % (ref 0–5)
ERYTHROCYTE [DISTWIDTH] IN BLOOD BY AUTOMATED COUNT: 12.9 % (ref 11.6–14.5)
HCT VFR BLD AUTO: 45.3 % (ref 35–45)
HGB BLD-MCNC: 15.8 G/DL (ref 12–16)
LYMPHOCYTES # BLD: 2.6 K/UL (ref 0.9–3.6)
LYMPHOCYTES NFR BLD: 27 % (ref 21–52)
MCH RBC QN AUTO: 31.7 PG (ref 24–34)
MCHC RBC AUTO-ENTMCNC: 34.9 G/DL (ref 31–37)
MCV RBC AUTO: 91 FL (ref 74–97)
MONOCYTES # BLD: 0.5 K/UL (ref 0.05–1.2)
MONOCYTES NFR BLD: 6 % (ref 3–10)
NEUTS SEG # BLD: 6.3 K/UL (ref 1.8–8)
NEUTS SEG NFR BLD: 66 % (ref 40–73)
PLATELET # BLD AUTO: 198 K/UL (ref 135–420)
PMV BLD AUTO: 10.8 FL (ref 9.2–11.8)
RBC # BLD AUTO: 4.98 M/UL (ref 4.2–5.3)
SPECIMEN EXP DATE BLD: NORMAL
WBC # BLD AUTO: 9.5 K/UL (ref 4.6–13.2)

## 2019-03-06 PROCEDURE — 74011000258 HC RX REV CODE- 258: Performed by: OBSTETRICS & GYNECOLOGY

## 2019-03-06 PROCEDURE — 85025 COMPLETE CBC W/AUTO DIFF WBC: CPT

## 2019-03-06 PROCEDURE — 0UQMXZZ REPAIR VULVA, EXTERNAL APPROACH: ICD-10-PCS | Performed by: OBSTETRICS & GYNECOLOGY

## 2019-03-06 PROCEDURE — 65270000029 HC RM PRIVATE

## 2019-03-06 PROCEDURE — 74011250637 HC RX REV CODE- 250/637

## 2019-03-06 PROCEDURE — 0KQM0ZZ REPAIR PERINEUM MUSCLE, OPEN APPROACH: ICD-10-PCS | Performed by: OBSTETRICS & GYNECOLOGY

## 2019-03-06 PROCEDURE — 75410000003 HC RECOV DEL/VAG/CSECN EA 0.5 HR

## 2019-03-06 PROCEDURE — 75410000002 HC LABOR FEE PER 1 HR

## 2019-03-06 PROCEDURE — 75410000000 HC DELIVERY VAGINAL/SINGLE

## 2019-03-06 PROCEDURE — 4A0HXCZ MEASUREMENT OF PRODUCTS OF CONCEPTION, CARDIAC RATE, EXTERNAL APPROACH: ICD-10-PCS | Performed by: OBSTETRICS & GYNECOLOGY

## 2019-03-06 PROCEDURE — 59025 FETAL NON-STRESS TEST: CPT

## 2019-03-06 PROCEDURE — 74011250636 HC RX REV CODE- 250/636: Performed by: OBSTETRICS & GYNECOLOGY

## 2019-03-06 PROCEDURE — 86900 BLOOD TYPING SEROLOGIC ABO: CPT

## 2019-03-06 PROCEDURE — 99283 EMERGENCY DEPT VISIT LOW MDM: CPT

## 2019-03-06 RX ORDER — OXYTOCIN/RINGER'S LACTATE 20/1000 ML
125 PLASTIC BAG, INJECTION (ML) INTRAVENOUS CONTINUOUS
Status: DISCONTINUED | OUTPATIENT
Start: 2019-03-06 | End: 2019-03-06 | Stop reason: HOSPADM

## 2019-03-06 RX ORDER — IBUPROFEN 400 MG/1
800 TABLET ORAL
Status: DISCONTINUED | OUTPATIENT
Start: 2019-03-06 | End: 2019-03-08 | Stop reason: HOSPADM

## 2019-03-06 RX ORDER — HYDROCORTISONE 25 MG/G
CREAM TOPICAL AS NEEDED
Status: DISCONTINUED | OUTPATIENT
Start: 2019-03-06 | End: 2019-03-08 | Stop reason: HOSPADM

## 2019-03-06 RX ORDER — OXYTOCIN/RINGER'S LACTATE 20/1000 ML
999 PLASTIC BAG, INJECTION (ML) INTRAVENOUS ONCE
Status: COMPLETED | OUTPATIENT
Start: 2019-03-06 | End: 2019-03-06

## 2019-03-06 RX ORDER — OXYCODONE AND ACETAMINOPHEN 5; 325 MG/1; MG/1
2 TABLET ORAL
Status: DISCONTINUED | OUTPATIENT
Start: 2019-03-06 | End: 2019-03-08 | Stop reason: HOSPADM

## 2019-03-06 RX ORDER — MISOPROSTOL 200 UG/1
TABLET ORAL
Status: COMPLETED
Start: 2019-03-06 | End: 2019-03-06

## 2019-03-06 RX ORDER — MISOPROSTOL 200 UG/1
800 TABLET ORAL ONCE
Status: COMPLETED | OUTPATIENT
Start: 2019-03-06 | End: 2019-03-06

## 2019-03-06 RX ORDER — NALBUPHINE HYDROCHLORIDE 10 MG/ML
10 INJECTION, SOLUTION INTRAMUSCULAR; INTRAVENOUS; SUBCUTANEOUS
Status: DISCONTINUED | OUTPATIENT
Start: 2019-03-06 | End: 2019-03-06 | Stop reason: HOSPADM

## 2019-03-06 RX ORDER — TERBUTALINE SULFATE 1 MG/ML
0.25 INJECTION SUBCUTANEOUS
Status: DISCONTINUED | OUTPATIENT
Start: 2019-03-06 | End: 2019-03-06 | Stop reason: HOSPADM

## 2019-03-06 RX ORDER — MISOPROSTOL 200 UG/1
TABLET ORAL
Status: DISPENSED
Start: 2019-03-06 | End: 2019-03-06

## 2019-03-06 RX ORDER — LIDOCAINE HYDROCHLORIDE 10 MG/ML
20 INJECTION, SOLUTION EPIDURAL; INFILTRATION; INTRACAUDAL; PERINEURAL AS NEEDED
Status: COMPLETED | OUTPATIENT
Start: 2019-03-06 | End: 2019-03-06

## 2019-03-06 RX ORDER — ZOLPIDEM TARTRATE 5 MG/1
5 TABLET ORAL
Status: DISCONTINUED | OUTPATIENT
Start: 2019-03-06 | End: 2019-03-08 | Stop reason: HOSPADM

## 2019-03-06 RX ORDER — PROMETHAZINE HYDROCHLORIDE 25 MG/ML
25 INJECTION, SOLUTION INTRAMUSCULAR; INTRAVENOUS
Status: DISCONTINUED | OUTPATIENT
Start: 2019-03-06 | End: 2019-03-08 | Stop reason: HOSPADM

## 2019-03-06 RX ORDER — METHYLERGONOVINE MALEATE 0.2 MG/ML
0.2 INJECTION INTRAVENOUS AS NEEDED
Status: DISCONTINUED | OUTPATIENT
Start: 2019-03-06 | End: 2019-03-06 | Stop reason: HOSPADM

## 2019-03-06 RX ORDER — ACETAMINOPHEN 325 MG/1
650 TABLET ORAL
Status: DISCONTINUED | OUTPATIENT
Start: 2019-03-06 | End: 2019-03-08 | Stop reason: HOSPADM

## 2019-03-06 RX ORDER — MINERAL OIL
30 OIL (ML) ORAL AS NEEDED
Status: DISCONTINUED | OUTPATIENT
Start: 2019-03-06 | End: 2019-03-06 | Stop reason: HOSPADM

## 2019-03-06 RX ORDER — SODIUM CHLORIDE, SODIUM LACTATE, POTASSIUM CHLORIDE, CALCIUM CHLORIDE 600; 310; 30; 20 MG/100ML; MG/100ML; MG/100ML; MG/100ML
125 INJECTION, SOLUTION INTRAVENOUS CONTINUOUS
Status: DISCONTINUED | OUTPATIENT
Start: 2019-03-06 | End: 2019-03-06 | Stop reason: HOSPADM

## 2019-03-06 RX ORDER — HYDROMORPHONE HYDROCHLORIDE 1 MG/ML
1 INJECTION, SOLUTION INTRAMUSCULAR; INTRAVENOUS; SUBCUTANEOUS
Status: DISCONTINUED | OUTPATIENT
Start: 2019-03-06 | End: 2019-03-06 | Stop reason: HOSPADM

## 2019-03-06 RX ORDER — BUTORPHANOL TARTRATE 1 MG/ML
2 INJECTION INTRAMUSCULAR; INTRAVENOUS
Status: DISCONTINUED | OUTPATIENT
Start: 2019-03-06 | End: 2019-03-06 | Stop reason: HOSPADM

## 2019-03-06 RX ORDER — AMOXICILLIN 250 MG
1 CAPSULE ORAL
Status: DISCONTINUED | OUTPATIENT
Start: 2019-03-06 | End: 2019-03-08 | Stop reason: HOSPADM

## 2019-03-06 RX ADMIN — SODIUM CHLORIDE 5 MILLION UNITS: 900 INJECTION INTRAVENOUS at 02:05

## 2019-03-06 RX ADMIN — MISOPROSTOL 1000 MCG: 200 TABLET ORAL at 02:44

## 2019-03-06 RX ADMIN — LIDOCAINE HYDROCHLORIDE 20 ML: 10 INJECTION, SOLUTION EPIDURAL; INFILTRATION; INTRACAUDAL; PERINEURAL at 02:41

## 2019-03-06 RX ADMIN — Medication 19980 MILLI-UNITS/HR: at 02:25

## 2019-03-06 NOTE — H&P
History & Physical    Name: Jose Leyva MRN: [id number]  SSN: [id number]    YOB: 1985  Age: 26 y.o. Sex: female        Subjective:     Estimated Date of Delivery: 3/8/19  OB History      Para Term  AB Living    3 2 1 1 1 2    SAB TAB Ectopic Molar Multiple Live Births    1       0 2        Obstetric Comments    PROM at 35 weeks    LMP no period since miscarriage in May  Periods irregular, last 6 days, flow medium, minimal dysmenorrhea  History of sexually transmitted infections never  Last pap unsure            Ms. Teri Longoria is admitted with pregnancy at 39w5d for active labor. Checked by RN and noted to be /1. Prenatal course was complicated by h/o [email or web address], h/o LEEP followed by SHERLYN for cervical length. Please see prenatal records for details. Past Medical History:   Diagnosis Date    Abnormal Papanicolaou smear of cervix     conization of cervix for abn pap    Infertility, female      Past Surgical History:   Procedure Laterality Date    CKC, AKA COLD KNIFE CONE       No Known Allergies  Prior to Admission medications    Medication Sig Start Date End Date Taking? Authorizing Provider   prenatal multivit-ca-min-fe-fa tab Take  by mouth. Yes Provider, Historical      Social History     Occupational History    Not on file   Tobacco Use    Smoking status: Former Smoker    Smokeless tobacco: Never Used   Substance and Sexual Activity    Alcohol use: Yes     Comment: wine monthly, not while pregnant    Drug use: No    Sexual activity: Yes     Partners: Male     Family History   Problem Relation Age of Onset    No Known Problems Mother     Heart Disease Father            Review of Systems: A comprehensive review of systems was negative except for that written in the HPI. Objective:     Vitals: There were no vitals filed for this visit. Physical Exam:  Patient without distress.   Abdomen: soft, nontender, nondistended, normal bowel sounds  Fundus: soft and non tender  Cervical Exam: 10/100/0  Lower Extremities: no calf tenderness  Membranes:  Intact  Fetal Heart Rate: Reactive  Baseline: 125 per minute  Variability: moderate  Accelerations: yes  Decelerations: none  Uterine contractions: irregular, every 2-5 minutes    Prenatal Labs:     Lab Results   Component Value Date/Time    Rubella, External immune 09/01/2015    HBsAg, External negative 09/01/2015    RPR, External non reactive 09/01/2015    Gonorrhea, External negative 09/01/2015    Chlamydia, External negative 09/01/2015           Assessment/Plan:     Plan: Admit for Reassuring fetal status, Labor  Progressing normally, now fully dilated feeling rectal pressure. Will start pushing with patient. Continue plan for vaginal delivery. Group B Strep was positive, will treat prophylactically with penicillin.     Signed By:  Samson Goldman MD     March 6, 2019 3:07 AM

## 2019-03-06 NOTE — ROUTINE PROCESS
0720: Bedside and Verbal shift change report given to ALEKSANDRA Ventura (oncoming nurse) by Tony Smith (offgoing nurse). Report included the following information SBAR.     0725: Pt up in bed, significant other at bedside,  sleeping in crib at mothers bedside. Breakfast tray brought in to patient. Pt denies further assistance needed at this time    0752: Pt assessed, up  In bed, significant other at bedside    0815: Bedside and Verbal shift change report given to JENNI Way (Postpartum nurse) by ALEKSANDRA Ventura (Labor/Delivery nurse). Report included the following information SBAR.

## 2019-03-06 NOTE — L&D DELIVERY NOTE
Delivery Note    Obstetrician:  Trevor Carranza MD    Pre-Delivery Diagnosis: Term pregnancy, Spontaneous labor, Single fetus and Pregnancy complicated by: h/o LEEP, h/o PPROM@ 35wks,     Post-Delivery Diagnosis: Living  infant(s) and Female    Procedure: Spontaneous vaginal delivery    Epidural: NO    Monitor:  Fetal Heart Tones - External and Uterine Contractions - External    Indications for instrumental delivery: none    Estimated Blood Loss: 350cc    Episiotomy: none    Laceration(s):  2nd degree and bilateral labial lacerations    Laceration(s) repair: YES, 2nd degree laceration repaired with 2-0 vicryl rapide, and bilateral laceration repaired with 3-0 vicryl in an interrupted fashion. Presentation: Cephalic    Fetal Description: renteria     birth weight  3470g    Apgar - One Minute: 8    Apgar - Five Minutes: 9    Umbilical Cord: 3 vessels present and Cord blood sent to lab for type, Rh, and Lesa' test    Specimens: placenta           Complications:  [id number] cytotec given rectally for extra bleeding noted after delivery.         Signed By:  Trevor Carranza MD     2019 3:18 AM

## 2019-03-07 PROBLEM — Z98.890 HISTORY OF LOOP ELECTRICAL EXCISION PROCEDURE (LEEP): Status: ACTIVE | Noted: 2019-03-07

## 2019-03-07 PROBLEM — Z87.59 HISTORY OF PRETERM PREMATURE RUPTURE OF MEMBRANES: Status: ACTIVE | Noted: 2019-03-07

## 2019-03-07 PROBLEM — O09.523 ELDERLY MULTIGRAVIDA IN THIRD TRIMESTER: Status: ACTIVE | Noted: 2019-03-07

## 2019-03-07 LAB
HCT VFR BLD AUTO: 38.7 % (ref 35–45)
HGB BLD-MCNC: 13.3 G/DL (ref 12–16)

## 2019-03-07 PROCEDURE — 36415 COLL VENOUS BLD VENIPUNCTURE: CPT

## 2019-03-07 PROCEDURE — 85018 HEMOGLOBIN: CPT

## 2019-03-07 PROCEDURE — 85014 HEMATOCRIT: CPT

## 2019-03-07 PROCEDURE — 65270000029 HC RM PRIVATE

## 2019-03-07 RX ORDER — IBUPROFEN 800 MG/1
800 TABLET ORAL
Qty: 60 TAB | Refills: 1 | Status: SHIPPED | OUTPATIENT
Start: 2019-03-07 | End: 2019-05-20

## 2019-03-07 NOTE — ROUTINE PROCESS
0700: Bedside and Verbal shift change report given to ALEKSANDRA Ventura (oncoming nurse) by Vika Echeverria (offgoing nurse). Report included the following information SBAR.     0715: Introduction to Pt, Discussed care plan, Pt verbalizes understanding of care plan. Safety issues check. Pt denies needs or assistance at this time. [place] brought to nursery related to morning assessment    0740: Morning assessment completed, pt in bed resting, denies pain or further assistance needed at this time. Pt denies c pain, SOB, or difficulty breathing    1900: Bedside and Verbal shift change report given to JENNI Echeverria (oncoming nurse) by ALEKSANDRA Ventura (offgoing nurse). Report included the following information SBAR.

## 2019-03-07 NOTE — DISCHARGE SUMMARY
Obstetrical Discharge Summary     Name: Jazz Harp MRN: [id number]  SSN: [id number]    YOB: 1985  Age: 26 y.o. Sex: female      Admit Date: 3/6/2019    Discharge Date: 3/8/2019    Admitting Physician: Hood Quezada MD     Attending Physician:  Nery Melendez MD     Discharge Diagnoses:   Information for the patient's :  [place] [[id number]]   Delivery of a 7 lb 10.4 oz (3.47 kg) female infant via Vaginal, Spontaneous on 3/6/2019 at 2:21 AM  by . Apgars were 8 and 9. Additional Diagnoses:   Problem List as of 3/7/2019 Date Reviewed: 2019          Codes Class Noted - Resolved    Elderly multigravida in third trimester ICD-10-CM: O09.523  ICD-9-CM: 659.63  3/7/2019 - Present        History of loop electrical excision procedure (LEEP) ICD-10-CM: G99.419  ICD-9-CM: V45.89  3/7/2019 - Present        History of  premature rupture of membranes ICD-10-CM: Z87.59  ICD-9-CM: V13.29  3/7/2019 - Present        Normal labor ICD-10-CM: O80, Z37.9  ICD-9-CM: 652  3/6/2019 - Present              Lab Results   Component Value Date/Time    Rubella, External immune 2015     Recent Labs     19  0703   HGB 13.3       Hospital Course: Normal hospital course following the delivery. Patient Instructions:   Current Discharge Medication List      START taking these medications    Details   ibuprofen (MOTRIN) 800 mg tablet Take 1 Tab by mouth every eight (8) hours as needed for Pain. Qty: 60 Tab, Refills: 1         CONTINUE these medications which have NOT CHANGED    Details   prenatal multivit-ca-min-fe-fa tab Take  by mouth. Follow-up Appointments   Procedures    FOLLOW UP VISIT Appointment in: Two Weeks     Standing Status:   Standing     Number of Occurrences:   1     Order Specific Question:   Appointment in     Answer:    Two Weeks        Signed By:  Riana Conklin MD     2019 10:16 AM                      BST

## 2019-03-07 NOTE — LACTATION NOTE
This note was copied from a baby's chart. Mother and baby are resting at this time. Mother denies questions regarding breastfeeding. Staff/lactation will continue to offer assistance at any time.

## 2019-03-08 VITALS
HEART RATE: 93 BPM | SYSTOLIC BLOOD PRESSURE: 120 MMHG | OXYGEN SATURATION: 96 % | RESPIRATION RATE: 18 BRPM | TEMPERATURE: 98.9 F | DIASTOLIC BLOOD PRESSURE: 82 MMHG

## 2019-03-08 NOTE — DISCHARGE INSTRUCTIONS
Vaginal Childbirth: Care Instructions  Your Care Instructions    Your body will slowly heal in the next few weeks. It is easy to get too tired and overwhelmed during the first weeks after your baby is born. Changes in your hormones can shift your mood without warning. You may find it hard to meet the extra demands on your energy and time. Take it easy on yourself. Follow-up care is a key part of your treatment and safety. Be sure to make and go to all appointments, and call your doctor if you are having problems. It's also a good idea to know your test results and keep a list of the medicines you take. How can you care for yourself at home? · Vaginal bleeding and cramps  ? After delivery, you will have a bloody discharge from the vagina. This will turn pink within a week and then white or yellow after about 10 days. It may last for 2 to 4 weeks or longer, until the uterus has healed. Use pads instead of tampons until you stop bleeding. ? Do not worry if you pass some blood clots, as long as they are smaller than a golf ball. If you have a tear or stitches in your vaginal area, change the pad at least every 4 hours to prevent soreness and infection. ? You may have cramps for the first few days after childbirth. These are normal and occur as the uterus shrinks to normal size. Take an over-the-counter pain medicine, such as acetaminophen (Tylenol), ibuprofen (Advil, Motrin), or naproxen (Aleve), for cramps. Read and follow all instructions on the label. Do not take aspirin, because it can cause more bleeding. ? Do not take two or more pain medicines at the same time unless the doctor told you to. Many pain medicines have acetaminophen, which is Tylenol. Too much acetaminophen (Tylenol) can be harmful. · Stitches  ? If you have stitches, they will dissolve on their own and do not need to be removed. Follow your doctor's instructions for cleaning the stitched area.   ? Put ice or a cold pack on your painful area for 10 to 20 minutes at a time, several times a day, for the first few days. Put a thin cloth between the ice and your skin. ? Sit in a few inches of warm water (sitz bath) 3 times a day and after bowel movements. The warm water helps with pain and itching. If you do not have a tub, a warm shower might help. · Breast fullness  ? Your breasts may overfill (engorge) in the first few days after delivery. To help milk flow and to relieve pain, warm your breasts in the shower or by using warm, moist towels before nursing. ? If you are not nursing, do not put warmth on your breasts or touch your breasts. Wear a tight bra or sports bra and use ice until the fullness goes away. This usually takes 2 to 3 days. ? Put ice or a cold pack on your breast after nursing to reduce swelling and pain. Put a thin cloth between the ice and your skin. · Activity  ? Eat a balanced diet. Do not try to lose weight by cutting calories. Keep taking your prenatal vitamins, or take a multivitamin. ? Get as much rest as you can. Try to take naps when your baby sleeps during the day. ? Get some exercise every day. But do not do any heavy exercise until your doctor says it is okay. ? Wait until you are healed (about 4 to 6 weeks) before you have sexual intercourse. Your doctor will tell you when it is okay to have sex. ? Talk to your doctor about birth control. You can get pregnant even before your period returns. Also, you can get pregnant while you are breastfeeding. · Mental health  ? It is normal to have some sadness, anxiety, sleeplessness, and mood swings after you go home. If you feel upset or hopeless for more than a few days or are having trouble doing the things you need to do, talk to your doctor. · Constipation and hemorrhoids  ? Drink plenty of fluids, enough so that your urine is light yellow or clear like water.  If you have kidney, heart, or liver disease and have to limit fluids, talk with your doctor before you increase the amount of fluids you drink. ? Eat plenty of fiber each day. Have a bran muffin or bran cereal for breakfast, and try eating a piece of fruit for a mid-afternoon snack. ? For painful, itchy hemorrhoids, put ice or a cold pack on the area several times a day for 10 minutes at a time. Follow this by putting a warm compress on the area for another 10 to 20 minutes or by sitting in a shallow, warm bath. When should you call for help? Call 911 anytime you think you may need emergency care. For example, call if:    · You passed out (lost consciousness).    Call your doctor now or seek immediate medical care if:    · You have severe vaginal bleeding.     · You are dizzy or lightheaded, or you feel like you may faint.     · You have a fever.     · You have new or more pain in your belly or pelvis.    Watch closely for changes in your health, and be sure to contact your doctor if:    · Your vaginal bleeding seems to be getting heavier.     · You have new or worse vaginal discharge.     · You feel sad, anxious, or hopeless for more than a few days.     · You do not get better as expected. Where can you learn more? Go to http://ayaka-marie.info/. Enter D685 in the search box to learn more about \"Vaginal Childbirth: Care Instructions. \"  Current as of: 2018  Content Version: 11.9  © 2658-5548 [employer]. Care instructions adapted under license by [employer] (which disclaims liability or warranty for this information). If you have questions about a medical condition or this instruction, always ask your healthcare professional. [place] [address] any warranty or liability for your use of this information. Patient Education        After Your Delivery (the Postpartum Period): Care Instructions  Your Care Instructions    Congratulations on the birth of your baby.  Like pregnancy, the  period can be a time of excitement, imer, and exhaustion. You may look at your wondrous little baby and feel happy. You may also be overwhelmed by your new sleep hours and new responsibilities. At first, babies often sleep during the days and are awake at night. They do not have a pattern or routine. They may make sudden gasps, jerk themselves awake, or look like they have crossed eyes. These are all normal, and they may even make you smile. In these first weeks after delivery, try to take good care of yourself. It may take 4 to 6 weeks to feel like yourself again, and possibly longer if you had a  birth. You will likely feel very tired for several weeks. Your days will be full of ups and downs, but lots of imer as well. Follow-up care is a key part of your treatment and safety. Be sure to make and go to all appointments, and call your doctor if you are having problems. It's also a good idea to know your test results and keep a list of the medicines you take. How can you care for yourself at home? Take care of your body after delivery  · Use pads instead of tampons for the bloody flow that may last as long as 2 weeks. · Ease cramps with ibuprofen (Advil, Motrin). · Ease soreness of hemorrhoids and the area between your vagina and rectum with ice compresses or witch hazel pads. · Ease constipation by drinking lots of fluid and eating high-fiber foods. Ask your doctor about over-the-counter stool softeners. · Cleanse yourself with a gentle squeeze of warm water from a bottle instead of wiping with toilet paper. · Take a sitz bath in warm water several times a day. · Wear a good nursing bra. Ease sore and swollen breasts with warm, wet washcloths. · If you are not breastfeeding, use ice rather than heat for breast soreness. · Your period may not start for several months if you are breastfeeding. You may bleed more, and longer at first, than you did before you got pregnant.   · Wait until you are healed (about 4 to 6 weeks) before you have sexual intercourse. Your doctor will tell you when it is okay to have sex. · Try not to travel with your baby for 5 or 6 weeks. If you take a long car trip, make frequent stops to walk around and stretch. Avoid exhaustion  · Rest every day. Try to nap when your baby naps. · Ask another adult to be with you for a few days after delivery. · Plan for  if you have other children. · Stay flexible so you can eat at odd hours and sleep when you need to. Both you and your baby are making new schedules. · Plan small trips to get out of the house. Change can make you feel less tired. · Ask for help with housework, cooking, and shopping. Remind yourself that your job is to care for your baby. Know about help for postpartum depression  · \"Baby blues\" are common for the first 1 to 2 weeks after birth. You may cry or feel sad or irritable for no reason. · Rest whenever you can. Being tired makes it harder to handle your emotions. · Go for walks with your baby. · Talk to your partner, friends, and family about your feelings. · If your symptoms last for more than a few weeks, or if you feel very depressed, ask your doctor for help. · Postpartum depression can be treated. Support groups and counseling can help. Sometimes medicine can also help. Stay healthy  · Eat healthy foods so you have more energy and lose extra baby pounds. · If you breastfeed, avoid drugs. If you quit smoking during pregnancy, try to stay smoke-free. If you choose to have a drink now and then, have only one drink, and limit the number of occasions that you have a drink. Wait to breastfeed at least 2 hours after you have a drink to reduce the amount of alcohol the baby may get in the milk. · Start daily exercise after 4 to 6 weeks, but rest when you feel tired. · Learn exercises to tone your belly. Do Kegel exercises to regain strength in your pelvic muscles. You can do these exercises while you stand or sit. ?  Squeeze the same muscles you would use to stop your urine. Your belly and thighs should not move. ? Hold the squeeze for 3 seconds, and then relax for 3 seconds. ? Start with 3 seconds. Then add 1 second each week until you are able to squeeze for 10 seconds. ? Repeat the exercise 10 to 15 times for each session. Do three or more sessions each day. · Find a class for new mothers and new babies that has an exercise time. · If you had a  birth, give yourself a bit more time before you exercise, and be careful. When should you call for help? Call 911 anytime you think you may need emergency care. For example, call if:    · You passed out (lost consciousness).    Call your doctor now or seek immediate medical care if:    · You have severe vaginal bleeding. This means you are passing blood clots and soaking through a pad each hour for 2 or more hours.     · You are dizzy or lightheaded, or you feel like you may faint.     · You have a fever.     · You have new belly pain, or your pain gets worse.    Watch closely for changes in your health, and be sure to contact your doctor if:    · Your vaginal bleeding seems to be getting heavier.     · You have new or worse vaginal discharge.     · You feel sad, anxious, or hopeless for more than a few days.     · You do not get better as expected. Where can you learn more? Go to http://ayaka-marie.info/. Enter A461 in the search box to learn more about \"After Your Delivery (the Postpartum Period): Care Instructions. \"  Current as of: 2018  Content Version: 11.9  © 3754-1360 [employer]. Care instructions adapted under license by [employer] (which disclaims liability or warranty for this information). If you have questions about a medical condition or this instruction, always ask your healthcare professional. [address] any warranty or liability for your use of this information.

## 2019-03-29 ENCOUNTER — ROUTINE PRENATAL (OUTPATIENT)
Dept: OBGYN CLINIC | Age: 34
End: 2019-03-29

## 2019-03-29 VITALS
HEART RATE: 87 BPM | SYSTOLIC BLOOD PRESSURE: 136 MMHG | HEIGHT: 63 IN | TEMPERATURE: 98.5 F | BODY MASS INDEX: 25.87 KG/M2 | OXYGEN SATURATION: 98 % | DIASTOLIC BLOOD PRESSURE: 77 MMHG | WEIGHT: 146 LBS

## 2019-03-29 NOTE — PATIENT INSTRUCTIONS
Vaginal Childbirth: Care Instructions  Your Care Instructions    Your body will slowly heal in the next few weeks. It is easy to get too tired and overwhelmed during the first weeks after your baby is born. Changes in your hormones can shift your mood without warning. You may find it hard to meet the extra demands on your energy and time. Take it easy on yourself. Follow-up care is a key part of your treatment and safety. Be sure to make and go to all appointments, and call your doctor if you are having problems. It's also a good idea to know your test results and keep a list of the medicines you take. How can you care for yourself at home? · Vaginal bleeding and cramps  ? After delivery, you will have a bloody discharge from the vagina. This will turn pink within a week and then white or yellow after about 10 days. It may last for 2 to 4 weeks or longer, until the uterus has healed. Use pads instead of tampons until you stop bleeding. ? Do not worry if you pass some blood clots, as long as they are smaller than a golf ball. If you have a tear or stitches in your vaginal area, change the pad at least every 4 hours to prevent soreness and infection. ? You may have cramps for the first few days after childbirth. These are normal and occur as the uterus shrinks to normal size. Take an over-the-counter pain medicine, such as acetaminophen (Tylenol), ibuprofen (Advil, Motrin), or naproxen (Aleve), for cramps. Read and follow all instructions on the label. Do not take aspirin, because it can cause more bleeding. ? Do not take two or more pain medicines at the same time unless the doctor told you to. Many pain medicines have acetaminophen, which is Tylenol. Too much acetaminophen (Tylenol) can be harmful. · Stitches  ? If you have stitches, they will dissolve on their own and do not need to be removed. Follow your doctor's instructions for cleaning the stitched area.   ? Put ice or a cold pack on your painful area for 10 to 20 minutes at a time, several times a day, for the first few days. Put a thin cloth between the ice and your skin. ? Sit in a few inches of warm water (sitz bath) 3 times a day and after bowel movements. The warm water helps with pain and itching. If you do not have a tub, a warm shower might help. · Breast fullness  ? Your breasts may overfill (engorge) in the first few days after delivery. To help milk flow and to relieve pain, warm your breasts in the shower or by using warm, moist towels before nursing. ? If you are not nursing, do not put warmth on your breasts or touch your breasts. Wear a tight bra or sports bra and use ice until the fullness goes away. This usually takes 2 to 3 days. ? Put ice or a cold pack on your breast after nursing to reduce swelling and pain. Put a thin cloth between the ice and your skin. · Activity  ? Eat a balanced diet. Do not try to lose weight by cutting calories. Keep taking your prenatal vitamins, or take a multivitamin. ? Get as much rest as you can. Try to take naps when your baby sleeps during the day. ? Get some exercise every day. But do not do any heavy exercise until your doctor says it is okay. ? Wait until you are healed (about 4 to 6 weeks) before you have sexual intercourse. Your doctor will tell you when it is okay to have sex. ? Talk to your doctor about birth control. You can get pregnant even before your period returns. Also, you can get pregnant while you are breastfeeding. · Mental health  ? It is normal to have some sadness, anxiety, sleeplessness, and mood swings after you go home. If you feel upset or hopeless for more than a few days or are having trouble doing the things you need to do, talk to your doctor. · Constipation and hemorrhoids  ? Drink plenty of fluids, enough so that your urine is light yellow or clear like water.  If you have kidney, heart, or liver disease and have to limit fluids, talk with your doctor before you increase the amount of fluids you drink. ? Eat plenty of fiber each day. Have a bran muffin or bran cereal for breakfast, and try eating a piece of fruit for a mid-afternoon snack. ? For painful, itchy hemorrhoids, put ice or a cold pack on the area several times a day for 10 minutes at a time. Follow this by putting a warm compress on the area for another 10 to 20 minutes or by sitting in a shallow, warm bath. When should you call for help? Call 911 anytime you think you may need emergency care. For example, call if:    · You passed out (lost consciousness).    Call your doctor now or seek immediate medical care if:    · You have severe vaginal bleeding.     · You are dizzy or lightheaded, or you feel like you may faint.     · You have a fever.     · You have new or more pain in your belly or pelvis.    Watch closely for changes in your health, and be sure to contact your doctor if:    · Your vaginal bleeding seems to be getting heavier.     · You have new or worse vaginal discharge.     · You feel sad, anxious, or hopeless for more than a few days.     · You do not get better as expected. Where can you learn more? Go to http://ayaka-marie.info/. Enter F151 in the search box to learn more about \"Vaginal Childbirth: Care Instructions. \"  Current as of: September 5, 2018  Content Version: 11.9  © 7481-0654 [employer]. Care instructions adapted under license by [employer] (which disclaims liability or warranty for this information). If you have questions about a medical condition or this instruction, always ask your healthcare professional. [place] [address] any warranty or liability for your use of this information.

## 2019-03-29 NOTE — PROGRESS NOTES
S/P delivery 3/6 by CHARLEY  Mood good, EDPS 3  Lochia stopped but had a little spotting yesterday  Baby doing well  Breast feeding  Partner very helpful  Planning on condoms for birth control  F/U 3 weeks

## 2019-05-20 ENCOUNTER — ROUTINE PRENATAL (OUTPATIENT)
Dept: OBGYN CLINIC | Age: 34
End: 2019-05-20

## 2019-05-20 VITALS
WEIGHT: 142.8 LBS | HEIGHT: 63 IN | SYSTOLIC BLOOD PRESSURE: 122 MMHG | BODY MASS INDEX: 25.3 KG/M2 | DIASTOLIC BLOOD PRESSURE: 77 MMHG | RESPIRATION RATE: 18 BRPM | TEMPERATURE: 98.7 F | HEART RATE: 90 BPM | OXYGEN SATURATION: 96 %

## 2019-05-20 NOTE — PATIENT INSTRUCTIONS
Vaginal Childbirth: Care Instructions  Your Care Instructions    Your body will slowly heal in the next few weeks. It is easy to get too tired and overwhelmed during the first weeks after your baby is born. Changes in your hormones can shift your mood without warning. You may find it hard to meet the extra demands on your energy and time. Take it easy on yourself. Follow-up care is a key part of your treatment and safety. Be sure to make and go to all appointments, and call your doctor if you are having problems. It's also a good idea to know your test results and keep a list of the medicines you take. How can you care for yourself at home? · Vaginal bleeding and cramps  ? After delivery, you will have a bloody discharge from the vagina. This will turn pink within a week and then white or yellow after about 10 days. It may last for 2 to 4 weeks or longer, until the uterus has healed. Use pads instead of tampons until you stop bleeding. ? Do not worry if you pass some blood clots, as long as they are smaller than a golf ball. If you have a tear or stitches in your vaginal area, change the pad at least every 4 hours to prevent soreness and infection. ? You may have cramps for the first few days after childbirth. These are normal and occur as the uterus shrinks to normal size. Take an over-the-counter pain medicine, such as acetaminophen (Tylenol), ibuprofen (Advil, Motrin), or naproxen (Aleve), for cramps. Read and follow all instructions on the label. Do not take aspirin, because it can cause more bleeding. ? Do not take two or more pain medicines at the same time unless the doctor told you to. Many pain medicines have acetaminophen, which is Tylenol. Too much acetaminophen (Tylenol) can be harmful. · Stitches  ? If you have stitches, they will dissolve on their own and do not need to be removed. Follow your doctor's instructions for cleaning the stitched area.   ? Put ice or a cold pack on your painful area for 10 to 20 minutes at a time, several times a day, for the first few days. Put a thin cloth between the ice and your skin. ? Sit in a few inches of warm water (sitz bath) 3 times a day and after bowel movements. The warm water helps with pain and itching. If you do not have a tub, a warm shower might help. · Breast fullness  ? Your breasts may overfill (engorge) in the first few days after delivery. To help milk flow and to relieve pain, warm your breasts in the shower or by using warm, moist towels before nursing. ? If you are not nursing, do not put warmth on your breasts or touch your breasts. Wear a tight bra or sports bra and use ice until the fullness goes away. This usually takes 2 to 3 days. ? Put ice or a cold pack on your breast after nursing to reduce swelling and pain. Put a thin cloth between the ice and your skin. · Activity  ? Eat a balanced diet. Do not try to lose weight by cutting calories. Keep taking your prenatal vitamins, or take a multivitamin. ? Get as much rest as you can. Try to take naps when your baby sleeps during the day. ? Get some exercise every day. But do not do any heavy exercise until your doctor says it is okay. ? Wait until you are healed (about 4 to 6 weeks) before you have sexual intercourse. Your doctor will tell you when it is okay to have sex. ? Talk to your doctor about birth control. You can get pregnant even before your period returns. Also, you can get pregnant while you are breastfeeding. · Mental health  ? It is normal to have some sadness, anxiety, sleeplessness, and mood swings after you go home. If you feel upset or hopeless for more than a few days or are having trouble doing the things you need to do, talk to your doctor. · Constipation and hemorrhoids  ? Drink plenty of fluids, enough so that your urine is light yellow or clear like water.  If you have kidney, heart, or liver disease and have to limit fluids, talk with your doctor before you increase the amount of fluids you drink. ? Eat plenty of fiber each day. Have a bran muffin or bran cereal for breakfast, and try eating a piece of fruit for a mid-afternoon snack. ? For painful, itchy hemorrhoids, put ice or a cold pack on the area several times a day for 10 minutes at a time. Follow this by putting a warm compress on the area for another 10 to 20 minutes or by sitting in a shallow, warm bath. When should you call for help? Call 911 anytime you think you may need emergency care. For example, call if:    · You passed out (lost consciousness).    Call your doctor now or seek immediate medical care if:    · You have severe vaginal bleeding.     · You are dizzy or lightheaded, or you feel like you may faint.     · You have a fever.     · You have new or more pain in your belly or pelvis.    Watch closely for changes in your health, and be sure to contact your doctor if:    · Your vaginal bleeding seems to be getting heavier.     · You have new or worse vaginal discharge.     · You feel sad, anxious, or hopeless for more than a few days.     · You do not get better as expected. Where can you learn more? Go to http://ayaka-marie.info/. Enter S648 in the search box to learn more about \"Vaginal Childbirth: Care Instructions. \"  Current as of: September 5, 2018  Content Version: 11.9  © 2923-0196 [employer]. Care instructions adapted under license by [employer] (which disclaims liability or warranty for this information). If you have questions about a medical condition or this instruction, always ask your healthcare professional. [place] [address] any warranty or liability for your use of this information.

## 2019-05-20 NOTE — PROGRESS NOTES
S/P delivery 3/6 by    Depression Scale  In the past 7 days:  I have been able to laugh and see the funny side of things[de-identified] As much as I always could  I have looked forward with enjoyment to things: As much as I ever did  I have blamed myself unnecessarily when things went wrong: Not very often  I have been anxious or worried for no good reason: Yes, sometimes  I have felt scared or panicky for no good reason: No, not at all  Things have been getting on top of me: Yes, sometimes I haven't been coping as well as usual  I have been so unhappy that I have had difficulty sleeping: No, not at all  I have felt sad or miserable: No, not at all  I have been so unhappy that I have been crying: No, never  The thought of harming myself has occured to me: Never  [place]  Depression Scale (EPDS)  [place]  Depression Score: 5  Lochia stopped  Baby doing well  Breast feeding  Partner very helpful  Planning on condoms for birth control  Incision C/D/I  EFGWNL  No CMT  Uterus 6 size  F/U 3-4 mos for well woman

## 2019-09-09 NOTE — PROGRESS NOTES
conducted an initial consultation and Spiritual Assessment for Tamera Naranjo, who is a 33 y.o.,female. Patients Primary Language is: [place]. According to the patients EMR Gnosticism Affiliation is: [place]. The reason the Patient came to the hospital is:   Patient Active Problem List    Diagnosis Date Noted     premature rupture of membranes 2016        The  provided the following Interventions:  Initiated a relationship of care and support. Explored issues of mejia, belief, spirituality and Mormonism/ritual needs while hospitalized. Listened empathically. Patient revealed that she and her sister were originally from part of [place] [facility]. Provided information about Spiritual Care Services. Offered prayer of blessing for the baby and the mother and assurance of continued prayers on patient's behalf. Chart reviewed. The following outcomes where achieved:  Patient shared limited information about both herd medical narrative and spiritual journey/beliefs.  confirmed Patient's Gnosticism Affiliation. Patient processed feeling about current hospitalization. Patient expressed gratitude for 's visit. Assessment:  Patient does not have any emotional concerns and even looked happy and contented. Patient does not have any Mormonism/cultural needs that will affect patients preferences in health care. There are no spiritual or Mormonism issues which require intervention at this time. Plan:  Chaplains will continue to follow and will provide pastoral care on an as needed/requested basis.  recommends bedside caregivers page  on duty if patient shows signs of acute spiritual or emotional distress.     Chaplain Resident [address]   [phone]
0715   Bedside and Verbal shift change report received from JENNI Coyle  (offgoing RN). Report included the following information SBAR, Kardex, Procedure Summary, Recent Results and Med Rec Status. Pt is awake in bed, no signs of distress, instructed to press call light if assistance is needed, call light within reach.  Baby to nursery for AM assessment. 0745  VS and assessment completed. VSS. Pt states she has cramping periodically, but it isn't bad. On needs at this time. She is \"ready to go home. \"    3873  Baby back to Mom's room. 0900  Check on Mom and baby. Baby asleep in bassinet. Mom has no needs. Told her will be back with d/c instructions once Dr. Kim Dubon puts in order. [id number]  Discharge instructions reviewed with patient, discussed DC medications. Patient verbalized understanding and copy of DC instructions given to patient. 56  Pt left unit via ambulatory with baby in car seat being carried by Dad.
0820   Bedside and Verbal shift change report received from ALEKSANDRA Ventura  (offgoing RN). Report included the following information SBAR, Kardex, Procedure Summary, Recent Results and Med Rec Status. Pt is awake in bed, no signs of distress, instructed to press call light if assistance is needed, call light within reach.      0830  Pt oriented to room. States pain is not too bad, but doesn't want any pain med. Baby is in bed with Mom. No needs at this time. 0930  Rounding done. Pt states she would like esha bottle and tucks. Given to pt. No other needs currently. Baby being held by family member. 1  Check on Mom and baby. Family member still holding baby. Mom resting in bed.    1210  Hourly rounding done. Baby nursing. Mom states no needs. Doing well.    1350  Check on Mom and baby. Baby nursing. Will return to do reassessment on Mom. Titus Braun RN to reassess baby. 1430  VS and reassessment done. VSS. Pt has no needs at this time. 80  Check on Mom and baby. Baby asleep in bassinet. Mom states she just tried to go to bathroom but \"didn't make it\" and urinated on floor. Call made to have floor cleaned by maintenance. Pt states no needs at this time. 1840  Hourly rounding done. Mom wondering about lab results on baby. Checked with nursery and results are fine. 1910  Verbal and bedside report given to oncoming RN, JENNI Conklin, using SBAR, Kardex, and MAR.
1930: Bedside and Verbal shift change report given to JENNI Alvarenga RN (oncoming nurse) by Markell Monae. Isaiah Hardin RN (offgoing nurse). Report included the following information SBAR, Kardex, Intake/Output, MAR and Recent Results. Uneventful night    0715: Bedside and Verbal shift change report given to JENNI Stein RN (oncoming nurse) by Markell Monae. Meghann Alvarenga RN (offgoing nurse). Report included the following information SBAR, Kardex, Intake/Output, MAR and Recent Results.
3274 Pt is a 26 y.o.  at 39w5d, arrived by wheelchair from ED, for contractions. She notes feeling contractions and some minor bleeding. positive ctxs, positive VB, feels like there is fluid LOF, positive FM. Her prenatal course has been consistent with WBOBGYN. Rafael Pena and EFM placed. 7901 SVE /-1    0145 Called Dr. Karina Dyer and notified her of patient status    0209 SROM, clear    0211 Dr. Karina Dyer at bedside, patient complete    [phone] Patient start pushing    0221  of female baby.     0400 Patient up to bathroom, patient voided
Post-Partum Day Number 1 Progress Note    @  Patient: Tammi Vazquez MRN: [id number]  SSN: [id number]    YOB: 1985  Age: 26 y.o. Sex: female      Subjective:     PostPartum Day: 1     Delivery: vaginal delivery    The patient feels well. Pain is off/on, has not used any pain medications. The baby is well. Baby is feeding via breast. Voiding spontaneous. The patient is ambulating well. The patient is tolerating a normal diet. Lochia like a period. Objective:      Patient Vitals for the past 8 hrs:   BP Temp Pulse Resp SpO2   19 0740 115/81 97.4 °F (36.3 °C) 82 16 97 %     General:    alert, cooperative, no distress   Fundus:   firm, FF at umbilicus   Extremities: No erythema, tenderness, edema   DVT Evaluation:  No evidence of DVT seen on physical exam.     Lab/Data Review:  Recent Results (from the past 24 hour(s))   HEMOGLOBIN    Collection Time: 19  7:03 AM   Result Value Ref Range    HGB 13.3 12.0 - 16.0 g/dL   HEMATOCRIT    Collection Time: 19  7:03 AM   Result Value Ref Range    HCT 38.7 35.0 - 45.0 %        Assessment:     Status post: Doing well postpartum vaginal delivery     Patient Active Problem List   Diagnosis Code    Normal labor O80, Z37.9    Elderly multigravida in third trimester O09.523    History of loop electrical excision procedure (LEEP) Z98.890    History of  premature rupture of membranes Z87.59       Plan:     1. Doing well, continue routine postpartum care.       Signed By: Gianni Bateman MD     2019 10:11 AM
Normal